# Patient Record
Sex: FEMALE | Race: WHITE | Employment: OTHER | URBAN - METROPOLITAN AREA
[De-identification: names, ages, dates, MRNs, and addresses within clinical notes are randomized per-mention and may not be internally consistent; named-entity substitution may affect disease eponyms.]

---

## 2017-01-21 ENCOUNTER — HOSPITAL ENCOUNTER (OUTPATIENT)
Dept: OTHER | Age: 72
Discharge: HOME | End: 2017-01-21
Attending: PHYSICAL MEDICINE & REHABILITATION | Admitting: PHYSICAL MEDICINE & REHABILITATION

## 2017-01-21 ENCOUNTER — HOSPITAL ENCOUNTER (OUTPATIENT)
Dept: OTHER | Age: 72
Discharge: HOME | End: 2017-01-21
Attending: INTERNAL MEDICINE | Admitting: INTERNAL MEDICINE

## 2017-01-21 LAB
ALBUMIN SERPL-MCNC: 4.1 G/DL (ref 3.4–5)
ANION GAP SERPL CALCULATED.3IONS-SCNC: 15 MMOL/L (ref 3–16)
BACTERIA: ABNORMAL /HPF
BILIRUBIN URINE: NEGATIVE
BLOOD, URINE: ABNORMAL
BUN BLDV-MCNC: 17 MG/DL (ref 7–20)
CALCIUM SERPL-MCNC: 9.4 MG/DL (ref 8.3–10.6)
CHLORIDE BLD-SCNC: 106 MMOL/L (ref 99–110)
CLARITY: ABNORMAL
CO2: 21 MMOL/L (ref 21–32)
COLOR: YELLOW
CREAT SERPL-MCNC: 0.9 MG/DL (ref 0.6–1.2)
EKG ATRIAL RATE: 75 BPM
EKG DIAGNOSIS: NORMAL
EKG P AXIS: 72 DEGREES
EKG P-R INTERVAL: 168 MS
EKG Q-T INTERVAL: 422 MS
EKG QRS DURATION: 90 MS
EKG QTC CALCULATION (BAZETT): 471 MS
EKG R AXIS: 8 DEGREES
EKG T AXIS: 55 DEGREES
EKG VENTRICULAR RATE: 75 BPM
EPITHELIAL CELLS, UA: ABNORMAL /HPF
GFR AFRICAN AMERICAN: >60
GFR NON-AFRICAN AMERICAN: >60
GLUCOSE BLD-MCNC: 116 MG/DL (ref 70–99)
GLUCOSE URINE: NEGATIVE MG/DL
HCT VFR BLD CALC: 38.2 % (ref 36–48)
HEMOGLOBIN: 12.4 G/DL (ref 12–16)
KETONES, URINE: NEGATIVE MG/DL
LEUKOCYTE ESTERASE, URINE: ABNORMAL
MAGNESIUM: 2.3 MG/DL (ref 1.8–2.4)
MCH RBC QN AUTO: 28.8 PG (ref 26–34)
MCHC RBC AUTO-ENTMCNC: 32.4 G/DL (ref 31–36)
MCV RBC AUTO: 88.9 FL (ref 80–100)
MICROSCOPIC EXAMINATION: YES
MUCUS: ABNORMAL /LPF
NITRITE, URINE: NEGATIVE
PDW BLD-RTO: 19.6 % (ref 12.4–15.4)
PH UA: 5
PHOSPHORUS: 3.9 MG/DL (ref 2.5–4.9)
PLATELET # BLD: 242 K/UL (ref 135–450)
PMV BLD AUTO: 6.9 FL (ref 5–10.5)
POTASSIUM SERPL-SCNC: 4.5 MMOL/L (ref 3.5–5.1)
PROTEIN UA: NEGATIVE MG/DL
RBC # BLD: 4.3 M/UL (ref 4–5.2)
RBC UA: ABNORMAL /HPF (ref 0–2)
SODIUM BLD-SCNC: 142 MMOL/L (ref 136–145)
SPECIFIC GRAVITY UA: 1.01
URINE TYPE: ABNORMAL
UROBILINOGEN, URINE: 0.2 E.U./DL
WBC # BLD: 7.6 K/UL (ref 4–11)
WBC UA: ABNORMAL /HPF (ref 0–5)

## 2017-01-21 PROCEDURE — 93010 ELECTROCARDIOGRAM REPORT: CPT | Performed by: INTERNAL MEDICINE

## 2017-02-10 ENCOUNTER — TELEPHONE (OUTPATIENT)
Dept: SURGERY | Age: 72
End: 2017-02-10

## 2017-02-10 ENCOUNTER — OFFICE VISIT (OUTPATIENT)
Dept: FAMILY MEDICINE CLINIC | Age: 72
End: 2017-02-10

## 2017-02-10 VITALS
TEMPERATURE: 98.4 F | SYSTOLIC BLOOD PRESSURE: 139 MMHG | BODY MASS INDEX: 35.15 KG/M2 | DIASTOLIC BLOOD PRESSURE: 94 MMHG | RESPIRATION RATE: 16 BRPM | OXYGEN SATURATION: 96 % | HEIGHT: 62 IN | HEART RATE: 85 BPM | WEIGHT: 191 LBS

## 2017-02-10 DIAGNOSIS — K43.2 VENTRAL INCISIONAL HERNIA: Primary | ICD-10-CM

## 2017-02-10 DIAGNOSIS — R10.9 ABDOMINAL PAIN, UNSPECIFIED LOCATION: ICD-10-CM

## 2017-02-10 PROCEDURE — 3014F SCREEN MAMMO DOC REV: CPT | Performed by: NURSE PRACTITIONER

## 2017-02-10 PROCEDURE — G8427 DOCREV CUR MEDS BY ELIG CLIN: HCPCS | Performed by: NURSE PRACTITIONER

## 2017-02-10 PROCEDURE — 99215 OFFICE O/P EST HI 40 MIN: CPT | Performed by: NURSE PRACTITIONER

## 2017-02-10 PROCEDURE — 1090F PRES/ABSN URINE INCON ASSESS: CPT | Performed by: NURSE PRACTITIONER

## 2017-02-10 PROCEDURE — 4040F PNEUMOC VAC/ADMIN/RCVD: CPT | Performed by: NURSE PRACTITIONER

## 2017-02-10 PROCEDURE — 3017F COLORECTAL CA SCREEN DOC REV: CPT | Performed by: NURSE PRACTITIONER

## 2017-02-10 PROCEDURE — 1036F TOBACCO NON-USER: CPT | Performed by: NURSE PRACTITIONER

## 2017-02-10 PROCEDURE — G8417 CALC BMI ABV UP PARAM F/U: HCPCS | Performed by: NURSE PRACTITIONER

## 2017-02-10 PROCEDURE — G8484 FLU IMMUNIZE NO ADMIN: HCPCS | Performed by: NURSE PRACTITIONER

## 2017-02-10 ASSESSMENT — ENCOUNTER SYMPTOMS
BACK PAIN: 1
VOMITING: 0
DIARRHEA: 0
TROUBLE SWALLOWING: 0
COUGH: 0
NAUSEA: 0
CHEST TIGHTNESS: 0
SHORTNESS OF BREATH: 0
SINUS PRESSURE: 0
WHEEZING: 0
CONSTIPATION: 1
ABDOMINAL PAIN: 1
EYE REDNESS: 0
COLOR CHANGE: 0
SORE THROAT: 0
EYE ITCHING: 1

## 2017-02-10 ASSESSMENT — PATIENT HEALTH QUESTIONNAIRE - PHQ9
2. FEELING DOWN, DEPRESSED OR HOPELESS: 0
SUM OF ALL RESPONSES TO PHQ QUESTIONS 1-9: 0
1. LITTLE INTEREST OR PLEASURE IN DOING THINGS: 0
SUM OF ALL RESPONSES TO PHQ9 QUESTIONS 1 & 2: 0

## 2017-02-15 ENCOUNTER — HOSPITAL ENCOUNTER (OUTPATIENT)
Dept: OTHER | Age: 72
Discharge: HOME | End: 2017-02-15
Attending: INTERNAL MEDICINE | Admitting: INTERNAL MEDICINE

## 2017-02-15 LAB
ALBUMIN SERPL-MCNC: 3.9 G/DL (ref 3.4–5)
ANION GAP SERPL CALCULATED.3IONS-SCNC: 17 MMOL/L (ref 3–16)
BUN BLDV-MCNC: 11 MG/DL (ref 7–20)
CALCIUM SERPL-MCNC: 9 MG/DL (ref 8.3–10.6)
CHLORIDE BLD-SCNC: 103 MMOL/L (ref 99–110)
CO2: 18 MMOL/L (ref 21–32)
CREAT SERPL-MCNC: 0.9 MG/DL (ref 0.6–1.2)
GFR AFRICAN AMERICAN: >60
GFR NON-AFRICAN AMERICAN: >60
GLUCOSE BLD-MCNC: 145 MG/DL (ref 70–99)
MAGNESIUM: 2 MG/DL (ref 1.8–2.4)
PHOSPHORUS: 3.6 MG/DL (ref 2.5–4.9)
POTASSIUM SERPL-SCNC: 3.9 MMOL/L (ref 3.5–5.1)
SODIUM BLD-SCNC: 138 MMOL/L (ref 136–145)

## 2017-02-16 ENCOUNTER — HOSPITAL ENCOUNTER (OUTPATIENT)
Dept: SURGERY | Age: 72
Discharge: HOME | End: 2017-02-16
Attending: SURGERY | Admitting: SURGERY

## 2017-02-16 VITALS
HEART RATE: 85 BPM | HEIGHT: 62 IN | SYSTOLIC BLOOD PRESSURE: 146 MMHG | DIASTOLIC BLOOD PRESSURE: 69 MMHG | OXYGEN SATURATION: 97 % | TEMPERATURE: 97.3 F | RESPIRATION RATE: 14 BRPM | BODY MASS INDEX: 34.96 KG/M2 | WEIGHT: 190 LBS

## 2017-02-16 PROCEDURE — 49656 PR LAP, RECURRENT INCISIONAL HERNIA REPAIR,REDUCIBLE: CPT | Performed by: SURGERY

## 2017-02-16 RX ORDER — SODIUM CHLORIDE 0.9 % (FLUSH) 0.9 %
10 SYRINGE (ML) INJECTION EVERY 12 HOURS SCHEDULED
Status: DISCONTINUED | OUTPATIENT
Start: 2017-02-16 | End: 2017-02-17 | Stop reason: HOSPADM

## 2017-02-16 RX ORDER — SODIUM CHLORIDE, SODIUM LACTATE, POTASSIUM CHLORIDE, CALCIUM CHLORIDE 600; 310; 30; 20 MG/100ML; MG/100ML; MG/100ML; MG/100ML
INJECTION, SOLUTION INTRAVENOUS CONTINUOUS
Status: DISCONTINUED | OUTPATIENT
Start: 2017-02-16 | End: 2017-02-17 | Stop reason: HOSPADM

## 2017-02-16 RX ORDER — MORPHINE SULFATE 2 MG/ML
1 INJECTION, SOLUTION INTRAMUSCULAR; INTRAVENOUS EVERY 5 MIN PRN
Status: DISCONTINUED | OUTPATIENT
Start: 2017-02-16 | End: 2017-02-17 | Stop reason: HOSPADM

## 2017-02-16 RX ORDER — MEPERIDINE HYDROCHLORIDE 50 MG/ML
12.5 INJECTION INTRAMUSCULAR; INTRAVENOUS; SUBCUTANEOUS EVERY 5 MIN PRN
Status: DISCONTINUED | OUTPATIENT
Start: 2017-02-16 | End: 2017-02-17 | Stop reason: HOSPADM

## 2017-02-16 RX ORDER — MORPHINE SULFATE 2 MG/ML
2 INJECTION, SOLUTION INTRAMUSCULAR; INTRAVENOUS EVERY 5 MIN PRN
Status: DISCONTINUED | OUTPATIENT
Start: 2017-02-16 | End: 2017-02-17 | Stop reason: HOSPADM

## 2017-02-16 RX ORDER — ONDANSETRON 2 MG/ML
4 INJECTION INTRAMUSCULAR; INTRAVENOUS
Status: ACTIVE | OUTPATIENT
Start: 2017-02-16 | End: 2017-02-16

## 2017-02-16 RX ORDER — DIPHENHYDRAMINE HYDROCHLORIDE 50 MG/ML
12.5 INJECTION INTRAMUSCULAR; INTRAVENOUS
Status: ACTIVE | OUTPATIENT
Start: 2017-02-16 | End: 2017-02-16

## 2017-02-16 RX ORDER — PROMETHAZINE HYDROCHLORIDE 25 MG/ML
6.25 INJECTION, SOLUTION INTRAMUSCULAR; INTRAVENOUS
Status: ACTIVE | OUTPATIENT
Start: 2017-02-16 | End: 2017-02-16

## 2017-02-16 RX ORDER — OXYCODONE HYDROCHLORIDE AND ACETAMINOPHEN 5; 325 MG/1; MG/1
1 TABLET ORAL PRN
Status: COMPLETED | OUTPATIENT
Start: 2017-02-16 | End: 2017-02-16

## 2017-02-16 RX ORDER — SODIUM CHLORIDE 0.9 % (FLUSH) 0.9 %
10 SYRINGE (ML) INJECTION PRN
Status: DISCONTINUED | OUTPATIENT
Start: 2017-02-16 | End: 2017-02-17 | Stop reason: HOSPADM

## 2017-02-16 RX ORDER — LABETALOL HYDROCHLORIDE 5 MG/ML
5 INJECTION, SOLUTION INTRAVENOUS EVERY 10 MIN PRN
Status: DISCONTINUED | OUTPATIENT
Start: 2017-02-16 | End: 2017-02-17 | Stop reason: HOSPADM

## 2017-02-16 RX ORDER — OXYCODONE HYDROCHLORIDE AND ACETAMINOPHEN 5; 325 MG/1; MG/1
2 TABLET ORAL PRN
Status: COMPLETED | OUTPATIENT
Start: 2017-02-16 | End: 2017-02-16

## 2017-02-16 RX ORDER — HYDRALAZINE HYDROCHLORIDE 20 MG/ML
5 INJECTION INTRAMUSCULAR; INTRAVENOUS EVERY 10 MIN PRN
Status: DISCONTINUED | OUTPATIENT
Start: 2017-02-16 | End: 2017-02-17 | Stop reason: HOSPADM

## 2017-02-16 RX ORDER — LIDOCAINE HYDROCHLORIDE 10 MG/ML
0.3 INJECTION, SOLUTION EPIDURAL; INFILTRATION; INTRACAUDAL; PERINEURAL
Status: ACTIVE | OUTPATIENT
Start: 2017-02-16 | End: 2017-02-16

## 2017-02-16 RX ADMIN — OXYCODONE HYDROCHLORIDE AND ACETAMINOPHEN 2 TABLET: 5; 325 TABLET ORAL at 13:20

## 2017-02-16 RX ADMIN — Medication 0.25 MG: at 12:27

## 2017-02-16 RX ADMIN — Medication 270 ML: at 11:27

## 2017-02-16 RX ADMIN — Medication 0.25 MG: at 12:35

## 2017-02-16 RX ADMIN — Medication 0.5 MG: at 12:02

## 2017-02-16 RX ADMIN — Medication 0.5 MG: at 12:12

## 2017-02-16 RX ADMIN — SODIUM CHLORIDE, SODIUM LACTATE, POTASSIUM CHLORIDE, CALCIUM CHLORIDE: 600; 310; 30; 20 INJECTION, SOLUTION INTRAVENOUS at 08:18

## 2017-02-16 ASSESSMENT — PAIN SCALES - GENERAL
PAINLEVEL_OUTOF10: 8
PAINLEVEL_OUTOF10: 6
PAINLEVEL_OUTOF10: 6
PAINLEVEL_OUTOF10: 8
PAINLEVEL_OUTOF10: 8
PAINLEVEL_OUTOF10: 0
PAINLEVEL_OUTOF10: 0
PAINLEVEL_OUTOF10: 8

## 2017-02-16 ASSESSMENT — PAIN DESCRIPTION - PAIN TYPE
TYPE: ACUTE PAIN
TYPE: ACUTE PAIN

## 2017-02-16 ASSESSMENT — PAIN DESCRIPTION - ORIENTATION
ORIENTATION: MID
ORIENTATION: MID

## 2017-02-16 ASSESSMENT — PAIN DESCRIPTION - LOCATION
LOCATION: ABDOMEN
LOCATION: ABDOMEN

## 2017-03-03 ENCOUNTER — OFFICE VISIT (OUTPATIENT)
Dept: SURGERY | Age: 72
End: 2017-03-03

## 2017-03-03 VITALS
SYSTOLIC BLOOD PRESSURE: 126 MMHG | BODY MASS INDEX: 35.59 KG/M2 | HEIGHT: 62 IN | WEIGHT: 193.4 LBS | HEART RATE: 66 BPM | DIASTOLIC BLOOD PRESSURE: 82 MMHG

## 2017-03-03 DIAGNOSIS — Z09 POSTOP CHECK: ICD-10-CM

## 2017-03-03 PROCEDURE — 99024 POSTOP FOLLOW-UP VISIT: CPT | Performed by: SURGERY

## 2017-03-16 ENCOUNTER — HOSPITAL ENCOUNTER (OUTPATIENT)
Dept: OTHER | Age: 72
Discharge: HOME | End: 2017-03-16
Attending: DERMATOLOGY | Admitting: DERMATOLOGY

## 2017-03-16 LAB
ALBUMIN SERPL-MCNC: 4.1 G/DL (ref 3.4–5)
ALP BLD-CCNC: 126 U/L (ref 40–129)
ALT SERPL-CCNC: 9 U/L (ref 10–40)
ANION GAP SERPL CALCULATED.3IONS-SCNC: 18 MMOL/L (ref 3–16)
AST SERPL-CCNC: 11 U/L (ref 15–37)
BASOPHILS ABSOLUTE: 0.1 K/UL (ref 0–0.2)
BASOPHILS RELATIVE PERCENT: 0.6 %
BILIRUB SERPL-MCNC: <0.2 MG/DL (ref 0–1)
BILIRUBIN DIRECT: <0.2 MG/DL (ref 0–0.3)
BILIRUBIN, INDIRECT: ABNORMAL MG/DL (ref 0–1)
BUN BLDV-MCNC: 20 MG/DL (ref 7–20)
CALCIUM SERPL-MCNC: 9.2 MG/DL (ref 8.3–10.6)
CHLORIDE BLD-SCNC: 95 MMOL/L (ref 99–110)
CO2: 23 MMOL/L (ref 21–32)
CREAT SERPL-MCNC: 1 MG/DL (ref 0.6–1.2)
EOSINOPHILS ABSOLUTE: 0.3 K/UL (ref 0–0.6)
EOSINOPHILS RELATIVE PERCENT: 3.8 %
GFR AFRICAN AMERICAN: >60
GFR NON-AFRICAN AMERICAN: 54
GLUCOSE BLD-MCNC: 196 MG/DL (ref 70–99)
HCT VFR BLD CALC: 39.6 % (ref 36–48)
HEMOGLOBIN: 12.7 G/DL (ref 12–16)
LYMPHOCYTES ABSOLUTE: 1.4 K/UL (ref 1–5.1)
LYMPHOCYTES RELATIVE PERCENT: 16.5 %
MCH RBC QN AUTO: 28.5 PG (ref 26–34)
MCHC RBC AUTO-ENTMCNC: 32 G/DL (ref 31–36)
MCV RBC AUTO: 89.2 FL (ref 80–100)
MONOCYTES ABSOLUTE: 0.4 K/UL (ref 0–1.3)
MONOCYTES RELATIVE PERCENT: 4.2 %
NEUTROPHILS ABSOLUTE: 6.4 K/UL (ref 1.7–7.7)
NEUTROPHILS RELATIVE PERCENT: 74.9 %
PDW BLD-RTO: 18.2 % (ref 12.4–15.4)
PHOSPHORUS: 3 MG/DL (ref 2.5–4.9)
PLATELET # BLD: 288 K/UL (ref 135–450)
PMV BLD AUTO: 7.2 FL (ref 5–10.5)
POTASSIUM SERPL-SCNC: 2.9 MMOL/L (ref 3.5–5.1)
RBC # BLD: 4.44 M/UL (ref 4–5.2)
SODIUM BLD-SCNC: 136 MMOL/L (ref 136–145)
TOTAL PROTEIN: 7.1 G/DL (ref 6.4–8.2)
WBC # BLD: 8.6 K/UL (ref 4–11)

## 2017-03-17 ENCOUNTER — TELEPHONE (OUTPATIENT)
Dept: FAMILY MEDICINE CLINIC | Age: 72
End: 2017-03-17

## 2017-03-17 DIAGNOSIS — E87.6 HYPOKALEMIA: ICD-10-CM

## 2017-03-17 DIAGNOSIS — I10 ESSENTIAL HYPERTENSION: Primary | ICD-10-CM

## 2017-03-20 ENCOUNTER — HOSPITAL ENCOUNTER (OUTPATIENT)
Dept: OTHER | Age: 72
Discharge: HOME | End: 2017-03-20
Attending: FAMILY MEDICINE | Admitting: FAMILY MEDICINE

## 2017-03-21 LAB
ANION GAP SERPL CALCULATED.3IONS-SCNC: 17 MMOL/L (ref 3–16)
BUN BLDV-MCNC: 15 MG/DL (ref 7–20)
CALCIUM SERPL-MCNC: 9.2 MG/DL (ref 8.3–10.6)
CHLORIDE BLD-SCNC: 102 MMOL/L (ref 99–110)
CO2: 22 MMOL/L (ref 21–32)
CREAT SERPL-MCNC: 0.9 MG/DL (ref 0.6–1.2)
GFR AFRICAN AMERICAN: >60
GFR NON-AFRICAN AMERICAN: >60
GLUCOSE BLD-MCNC: 164 MG/DL (ref 70–99)
POTASSIUM SERPL-SCNC: 3.9 MMOL/L (ref 3.5–5.1)
SODIUM BLD-SCNC: 141 MMOL/L (ref 136–145)

## 2017-03-24 RX ORDER — RANITIDINE 150 MG/1
TABLET ORAL
Qty: 180 TABLET | Refills: 1 | Status: SHIPPED | OUTPATIENT
Start: 2017-03-24 | End: 2017-09-18 | Stop reason: SDUPTHER

## 2017-04-14 ENCOUNTER — HOSPITAL ENCOUNTER (OUTPATIENT)
Dept: OTHER | Age: 72
Discharge: HOME | End: 2017-04-14
Attending: INTERNAL MEDICINE | Admitting: INTERNAL MEDICINE

## 2017-04-14 LAB
ANION GAP SERPL CALCULATED.3IONS-SCNC: 15 MMOL/L (ref 3–16)
BUN BLDV-MCNC: 16 MG/DL (ref 7–20)
CALCIUM SERPL-MCNC: 9.5 MG/DL (ref 8.3–10.6)
CHLORIDE BLD-SCNC: 100 MMOL/L (ref 99–110)
CO2: 24 MMOL/L (ref 21–32)
CREAT SERPL-MCNC: 0.9 MG/DL (ref 0.6–1.2)
GFR AFRICAN AMERICAN: >60
GFR NON-AFRICAN AMERICAN: >60
GLUCOSE BLD-MCNC: 122 MG/DL (ref 70–99)
POTASSIUM SERPL-SCNC: 3.4 MMOL/L (ref 3.5–5.1)
SODIUM BLD-SCNC: 139 MMOL/L (ref 136–145)

## 2017-06-20 PROBLEM — L03.116 LEFT LEG CELLULITIS: Status: ACTIVE | Noted: 2017-06-20

## 2017-07-07 ENCOUNTER — TELEPHONE (OUTPATIENT)
Dept: FAMILY MEDICINE CLINIC | Age: 72
End: 2017-07-07

## 2017-07-07 ENCOUNTER — HOSPITAL ENCOUNTER (OUTPATIENT)
Dept: OTHER | Age: 72
Discharge: HOME | End: 2017-07-07
Attending: DERMATOLOGY | Admitting: DERMATOLOGY

## 2017-07-07 LAB
ALBUMIN SERPL-MCNC: 4.6 G/DL (ref 3.4–5)
ALP BLD-CCNC: 117 U/L (ref 40–129)
ALT SERPL-CCNC: 14 U/L (ref 10–40)
ANION GAP SERPL CALCULATED.3IONS-SCNC: 19 MMOL/L (ref 3–16)
AST SERPL-CCNC: 18 U/L (ref 15–37)
BASOPHILS ABSOLUTE: 0 K/UL (ref 0–0.2)
BASOPHILS RELATIVE PERCENT: 0.6 %
BILIRUB SERPL-MCNC: <0.2 MG/DL (ref 0–1)
BILIRUBIN DIRECT: <0.2 MG/DL (ref 0–0.3)
BILIRUBIN, INDIRECT: NORMAL MG/DL (ref 0–1)
BUN BLDV-MCNC: 16 MG/DL (ref 7–20)
CALCIUM SERPL-MCNC: 9.8 MG/DL (ref 8.3–10.6)
CHLORIDE BLD-SCNC: 94 MMOL/L (ref 99–110)
CO2: 26 MMOL/L (ref 21–32)
CREAT SERPL-MCNC: 1.1 MG/DL (ref 0.6–1.2)
EOSINOPHILS ABSOLUTE: 0.5 K/UL (ref 0–0.6)
EOSINOPHILS RELATIVE PERCENT: 6.5 %
GFR AFRICAN AMERICAN: 59
GFR NON-AFRICAN AMERICAN: 48
GLUCOSE BLD-MCNC: 117 MG/DL (ref 70–99)
HCT VFR BLD CALC: 40.1 % (ref 36–48)
HEMOGLOBIN: 12.9 G/DL (ref 12–16)
LYMPHOCYTES ABSOLUTE: 1.5 K/UL (ref 1–5.1)
LYMPHOCYTES RELATIVE PERCENT: 19.8 %
MCH RBC QN AUTO: 28.9 PG (ref 26–34)
MCHC RBC AUTO-ENTMCNC: 32.2 G/DL (ref 31–36)
MCV RBC AUTO: 89.8 FL (ref 80–100)
MONOCYTES ABSOLUTE: 0.5 K/UL (ref 0–1.3)
MONOCYTES RELATIVE PERCENT: 7.1 %
NEUTROPHILS ABSOLUTE: 5 K/UL (ref 1.7–7.7)
NEUTROPHILS RELATIVE PERCENT: 66 %
PDW BLD-RTO: 20.3 % (ref 12.4–15.4)
PHOSPHORUS: 4.1 MG/DL (ref 2.5–4.9)
PLATELET # BLD: 295 K/UL (ref 135–450)
PMV BLD AUTO: 7.3 FL (ref 5–10.5)
POTASSIUM SERPL-SCNC: 3.4 MMOL/L (ref 3.5–5.1)
RBC # BLD: 4.47 M/UL (ref 4–5.2)
SODIUM BLD-SCNC: 139 MMOL/L (ref 136–145)
TOTAL PROTEIN: 7.6 G/DL (ref 6.4–8.2)
WBC # BLD: 7.6 K/UL (ref 4–11)

## 2017-07-07 RX ORDER — CEPHALEXIN 500 MG/1
500 CAPSULE ORAL 3 TIMES DAILY
Qty: 30 CAPSULE | Refills: 0 | Status: SHIPPED | OUTPATIENT
Start: 2017-07-07 | End: 2017-07-17

## 2017-07-12 ENCOUNTER — OFFICE VISIT (OUTPATIENT)
Dept: FAMILY MEDICINE CLINIC | Age: 72
End: 2017-07-12

## 2017-07-12 VITALS
WEIGHT: 165 LBS | RESPIRATION RATE: 16 BRPM | BODY MASS INDEX: 32.39 KG/M2 | SYSTOLIC BLOOD PRESSURE: 115 MMHG | DIASTOLIC BLOOD PRESSURE: 88 MMHG | HEART RATE: 88 BPM | TEMPERATURE: 98 F | HEIGHT: 60 IN

## 2017-07-12 DIAGNOSIS — L97.822 ULCER OF SHIN, LEFT, WITH FAT LAYER EXPOSED (HCC): Primary | ICD-10-CM

## 2017-07-12 DIAGNOSIS — I10 ESSENTIAL HYPERTENSION: ICD-10-CM

## 2017-07-12 PROCEDURE — 1111F DSCHRG MED/CURRENT MED MERGE: CPT | Performed by: FAMILY MEDICINE

## 2017-07-12 PROCEDURE — 99213 OFFICE O/P EST LOW 20 MIN: CPT | Performed by: FAMILY MEDICINE

## 2017-07-12 PROCEDURE — G8417 CALC BMI ABV UP PARAM F/U: HCPCS | Performed by: FAMILY MEDICINE

## 2017-07-12 PROCEDURE — 3014F SCREEN MAMMO DOC REV: CPT | Performed by: FAMILY MEDICINE

## 2017-07-12 PROCEDURE — 1123F ACP DISCUSS/DSCN MKR DOCD: CPT | Performed by: FAMILY MEDICINE

## 2017-07-12 PROCEDURE — 1090F PRES/ABSN URINE INCON ASSESS: CPT | Performed by: FAMILY MEDICINE

## 2017-07-12 PROCEDURE — 1036F TOBACCO NON-USER: CPT | Performed by: FAMILY MEDICINE

## 2017-07-12 PROCEDURE — 4040F PNEUMOC VAC/ADMIN/RCVD: CPT | Performed by: FAMILY MEDICINE

## 2017-07-12 PROCEDURE — 3017F COLORECTAL CA SCREEN DOC REV: CPT | Performed by: FAMILY MEDICINE

## 2017-07-12 PROCEDURE — G8399 PT W/DXA RESULTS DOCUMENT: HCPCS | Performed by: FAMILY MEDICINE

## 2017-07-12 PROCEDURE — G8427 DOCREV CUR MEDS BY ELIG CLIN: HCPCS | Performed by: FAMILY MEDICINE

## 2017-07-12 RX ORDER — POLYETHYLENE GLYCOL 3350 17 G/17G
POWDER, FOR SOLUTION ORAL
Qty: 527 G | Refills: 10 | Status: SHIPPED | OUTPATIENT
Start: 2017-07-12 | End: 2017-07-18 | Stop reason: SDUPTHER

## 2017-07-12 RX ORDER — MORPHINE SULFATE 10 MG/1
10 CAPSULE, EXTENDED RELEASE ORAL DAILY
COMMUNITY
End: 2017-07-25 | Stop reason: ALTCHOICE

## 2017-07-12 ASSESSMENT — ENCOUNTER SYMPTOMS
WHEEZING: 0
CONSTIPATION: 1
BACK PAIN: 1
COUGH: 0

## 2017-07-13 ENCOUNTER — TELEPHONE (OUTPATIENT)
Dept: FAMILY MEDICINE CLINIC | Age: 72
End: 2017-07-13

## 2017-07-14 ENCOUNTER — TELEPHONE (OUTPATIENT)
Dept: FAMILY MEDICINE CLINIC | Age: 72
End: 2017-07-14

## 2017-07-16 LAB
GRAM STAIN RESULT: NORMAL
WOUND/ABSCESS: NORMAL

## 2017-07-18 ENCOUNTER — TELEPHONE (OUTPATIENT)
Dept: FAMILY MEDICINE CLINIC | Age: 72
End: 2017-07-18

## 2017-07-18 RX ORDER — POLYETHYLENE GLYCOL 3350 17 G/17G
POWDER, FOR SOLUTION ORAL
Qty: 1000 G | Refills: 10 | Status: SHIPPED | OUTPATIENT
Start: 2017-07-18 | End: 2018-07-19 | Stop reason: SDUPTHER

## 2017-07-18 RX ORDER — CEPHALEXIN 500 MG/1
500 CAPSULE ORAL 3 TIMES DAILY
Qty: 20 CAPSULE | Refills: 0 | Status: SHIPPED | OUTPATIENT
Start: 2017-07-18 | End: 2017-07-25 | Stop reason: ALTCHOICE

## 2017-07-25 ENCOUNTER — HOSPITAL ENCOUNTER (OUTPATIENT)
Dept: WOUND CARE | Age: 72
Discharge: HOME | End: 2017-07-25
Attending: CLINICAL NURSE SPECIALIST | Admitting: CLINICAL NURSE SPECIALIST

## 2017-07-25 VITALS
TEMPERATURE: 97.8 F | DIASTOLIC BLOOD PRESSURE: 88 MMHG | RESPIRATION RATE: 18 BRPM | HEART RATE: 91 BPM | SYSTOLIC BLOOD PRESSURE: 134 MMHG | HEIGHT: 60 IN | BODY MASS INDEX: 34.87 KG/M2 | WEIGHT: 177.6 LBS

## 2017-07-25 PROCEDURE — 97597 DBRDMT OPN WND 1ST 20 CM/<: CPT | Performed by: CLINICAL NURSE SPECIALIST

## 2017-07-25 PROCEDURE — 17250 CHEM CAUT OF GRANLTJ TISSUE: CPT | Performed by: CLINICAL NURSE SPECIALIST

## 2017-07-25 RX ORDER — MORPHINE SULFATE 15 MG/1
15 TABLET ORAL EVERY 4 HOURS PRN
COMMUNITY
End: 2017-07-25 | Stop reason: ALTCHOICE

## 2017-07-25 RX ORDER — ERGOCALCIFEROL 1.25 MG/1
50000 CAPSULE ORAL
COMMUNITY

## 2017-07-25 RX ORDER — MORPHINE SULFATE 15 MG/1
15 TABLET, FILM COATED, EXTENDED RELEASE ORAL 2 TIMES DAILY
COMMUNITY

## 2017-07-25 ASSESSMENT — PAIN DESCRIPTION - ORIENTATION: ORIENTATION: LEFT

## 2017-07-25 ASSESSMENT — PAIN DESCRIPTION - PROGRESSION: CLINICAL_PROGRESSION: GRADUALLY WORSENING

## 2017-07-25 ASSESSMENT — PAIN SCALES - GENERAL: PAINLEVEL_OUTOF10: 5

## 2017-07-25 ASSESSMENT — PAIN DESCRIPTION - PAIN TYPE: TYPE: ACUTE PAIN

## 2017-07-25 ASSESSMENT — PAIN DESCRIPTION - ONSET: ONSET: GRADUAL

## 2017-07-25 ASSESSMENT — PAIN DESCRIPTION - FREQUENCY: FREQUENCY: CONTINUOUS

## 2017-07-25 ASSESSMENT — PAIN DESCRIPTION - DESCRIPTORS: DESCRIPTORS: ACHING;BURNING

## 2017-07-25 ASSESSMENT — PAIN DESCRIPTION - LOCATION: LOCATION: LEG

## 2017-08-01 ENCOUNTER — HOSPITAL ENCOUNTER (OUTPATIENT)
Dept: WOUND CARE | Age: 72
Discharge: HOME | End: 2017-08-01
Attending: CLINICAL NURSE SPECIALIST | Admitting: CLINICAL NURSE SPECIALIST

## 2017-08-01 VITALS
HEART RATE: 48 BPM | WEIGHT: 176.4 LBS | DIASTOLIC BLOOD PRESSURE: 87 MMHG | BODY MASS INDEX: 35.56 KG/M2 | SYSTOLIC BLOOD PRESSURE: 146 MMHG | HEIGHT: 59 IN | RESPIRATION RATE: 16 BRPM | TEMPERATURE: 97.4 F

## 2017-08-01 PROCEDURE — 97597 DBRDMT OPN WND 1ST 20 CM/<: CPT | Performed by: CLINICAL NURSE SPECIALIST

## 2017-08-01 ASSESSMENT — PAIN DESCRIPTION - ONSET: ONSET: ON-GOING

## 2017-08-01 ASSESSMENT — PAIN DESCRIPTION - ORIENTATION: ORIENTATION: LEFT;LOWER;OUTER

## 2017-08-01 ASSESSMENT — PAIN SCALES - GENERAL: PAINLEVEL_OUTOF10: 8

## 2017-08-01 ASSESSMENT — PAIN DESCRIPTION - LOCATION: LOCATION: LEG

## 2017-08-01 ASSESSMENT — PAIN DESCRIPTION - PAIN TYPE: TYPE: CHRONIC PAIN

## 2017-08-01 ASSESSMENT — PAIN DESCRIPTION - FREQUENCY: FREQUENCY: CONTINUOUS

## 2017-08-01 ASSESSMENT — PAIN DESCRIPTION - PROGRESSION: CLINICAL_PROGRESSION: NOT CHANGED

## 2017-08-01 ASSESSMENT — PAIN DESCRIPTION - DESCRIPTORS: DESCRIPTORS: STABBING

## 2017-08-08 ENCOUNTER — HOSPITAL ENCOUNTER (OUTPATIENT)
Dept: WOUND CARE | Age: 72
Discharge: HOME | End: 2017-08-08
Attending: CLINICAL NURSE SPECIALIST | Admitting: CLINICAL NURSE SPECIALIST

## 2017-08-08 VITALS
DIASTOLIC BLOOD PRESSURE: 78 MMHG | TEMPERATURE: 99.1 F | WEIGHT: 178.38 LBS | HEART RATE: 54 BPM | BODY MASS INDEX: 36.03 KG/M2 | SYSTOLIC BLOOD PRESSURE: 143 MMHG | RESPIRATION RATE: 20 BRPM

## 2017-08-08 PROCEDURE — 97597 DBRDMT OPN WND 1ST 20 CM/<: CPT | Performed by: CLINICAL NURSE SPECIALIST

## 2017-08-08 RX ORDER — DOXYCYCLINE HYCLATE 100 MG
100 TABLET ORAL 2 TIMES DAILY
Qty: 20 TABLET | Refills: 0 | Status: SHIPPED | OUTPATIENT
Start: 2017-08-08 | End: 2017-08-18

## 2017-08-08 ASSESSMENT — PAIN DESCRIPTION - PROGRESSION: CLINICAL_PROGRESSION: NOT CHANGED

## 2017-08-08 ASSESSMENT — PAIN DESCRIPTION - PAIN TYPE: TYPE: CHRONIC PAIN

## 2017-08-08 ASSESSMENT — PAIN DESCRIPTION - FREQUENCY: FREQUENCY: INTERMITTENT

## 2017-08-08 ASSESSMENT — PAIN SCALES - GENERAL: PAINLEVEL_OUTOF10: 7

## 2017-08-08 ASSESSMENT — PAIN DESCRIPTION - DESCRIPTORS: DESCRIPTORS: STABBING;BURNING

## 2017-08-08 ASSESSMENT — PAIN DESCRIPTION - ORIENTATION: ORIENTATION: LEFT

## 2017-08-08 ASSESSMENT — PAIN DESCRIPTION - LOCATION: LOCATION: LEG

## 2017-08-08 ASSESSMENT — PAIN DESCRIPTION - ONSET: ONSET: ON-GOING

## 2017-08-11 LAB
GRAM STAIN RESULT: NORMAL
WOUND/ABSCESS: NORMAL

## 2017-08-15 ENCOUNTER — HOSPITAL ENCOUNTER (OUTPATIENT)
Dept: WOUND CARE | Age: 72
Discharge: HOME | End: 2017-08-15
Attending: INTERNAL MEDICINE | Admitting: INTERNAL MEDICINE

## 2017-08-15 VITALS
SYSTOLIC BLOOD PRESSURE: 138 MMHG | BODY MASS INDEX: 34.88 KG/M2 | HEIGHT: 59 IN | WEIGHT: 173 LBS | TEMPERATURE: 99.2 F | RESPIRATION RATE: 17 BRPM | DIASTOLIC BLOOD PRESSURE: 85 MMHG | HEART RATE: 91 BPM

## 2017-08-15 PROCEDURE — 97597 DBRDMT OPN WND 1ST 20 CM/<: CPT | Performed by: INTERNAL MEDICINE

## 2017-08-20 PROBLEM — L03.116 LEFT LEG CELLULITIS: Status: RESOLVED | Noted: 2017-06-20 | Resolved: 2017-08-20

## 2017-08-20 PROBLEM — Z09 POSTOP CHECK: Status: RESOLVED | Noted: 2017-03-03 | Resolved: 2017-08-20

## 2017-08-22 ENCOUNTER — HOSPITAL ENCOUNTER (OUTPATIENT)
Dept: WOUND CARE | Age: 72
Discharge: HOME | End: 2017-08-22
Attending: CLINICAL NURSE SPECIALIST | Admitting: CLINICAL NURSE SPECIALIST

## 2017-08-22 VITALS
WEIGHT: 170 LBS | RESPIRATION RATE: 18 BRPM | HEART RATE: 69 BPM | SYSTOLIC BLOOD PRESSURE: 120 MMHG | HEIGHT: 59 IN | BODY MASS INDEX: 34.27 KG/M2 | TEMPERATURE: 97.3 F | DIASTOLIC BLOOD PRESSURE: 74 MMHG

## 2017-08-22 PROCEDURE — 97597 DBRDMT OPN WND 1ST 20 CM/<: CPT | Performed by: CLINICAL NURSE SPECIALIST

## 2017-08-22 ASSESSMENT — PAIN DESCRIPTION - PROGRESSION: CLINICAL_PROGRESSION: NOT CHANGED

## 2017-08-22 ASSESSMENT — PAIN DESCRIPTION - ONSET: ONSET: ON-GOING

## 2017-08-22 ASSESSMENT — PAIN DESCRIPTION - FREQUENCY: FREQUENCY: INTERMITTENT

## 2017-08-22 ASSESSMENT — PAIN SCALES - GENERAL: PAINLEVEL_OUTOF10: 5

## 2017-08-22 ASSESSMENT — PAIN DESCRIPTION - DESCRIPTORS: DESCRIPTORS: BURNING;STABBING

## 2017-08-22 ASSESSMENT — PAIN DESCRIPTION - ORIENTATION: ORIENTATION: LEFT

## 2017-08-22 ASSESSMENT — PAIN DESCRIPTION - PAIN TYPE: TYPE: CHRONIC PAIN

## 2017-08-22 ASSESSMENT — PAIN DESCRIPTION - LOCATION: LOCATION: LEG

## 2017-08-23 ENCOUNTER — HOSPITAL ENCOUNTER (OUTPATIENT)
Dept: OTHER | Age: 72
Discharge: HOME | End: 2017-08-23
Attending: FAMILY MEDICINE | Admitting: FAMILY MEDICINE

## 2017-08-23 ENCOUNTER — OFFICE VISIT (OUTPATIENT)
Dept: FAMILY MEDICINE CLINIC | Age: 72
End: 2017-08-23

## 2017-08-23 VITALS
DIASTOLIC BLOOD PRESSURE: 80 MMHG | BODY MASS INDEX: 34.13 KG/M2 | SYSTOLIC BLOOD PRESSURE: 134 MMHG | HEART RATE: 68 BPM | OXYGEN SATURATION: 96 % | RESPIRATION RATE: 16 BRPM | WEIGHT: 169 LBS | TEMPERATURE: 98 F

## 2017-08-23 DIAGNOSIS — M54.6 ACUTE MIDLINE THORACIC BACK PAIN: ICD-10-CM

## 2017-08-23 DIAGNOSIS — M54.6 ACUTE MIDLINE THORACIC BACK PAIN: Primary | ICD-10-CM

## 2017-08-23 LAB
BASOPHILS ABSOLUTE: 0 K/UL (ref 0–0.2)
BASOPHILS RELATIVE PERCENT: 0.4 %
EOSINOPHILS ABSOLUTE: 0.2 K/UL (ref 0–0.6)
EOSINOPHILS RELATIVE PERCENT: 1.8 %
HCT VFR BLD CALC: 41.4 % (ref 36–48)
HEMOGLOBIN: 13.4 G/DL (ref 12–16)
LYMPHOCYTES ABSOLUTE: 1.1 K/UL (ref 1–5.1)
LYMPHOCYTES RELATIVE PERCENT: 11.9 %
MCH RBC QN AUTO: 28.8 PG (ref 26–34)
MCHC RBC AUTO-ENTMCNC: 32.5 G/DL (ref 31–36)
MCV RBC AUTO: 88.7 FL (ref 80–100)
MONOCYTES ABSOLUTE: 0.3 K/UL (ref 0–1.3)
MONOCYTES RELATIVE PERCENT: 3.7 %
NEUTROPHILS ABSOLUTE: 7.5 K/UL (ref 1.7–7.7)
NEUTROPHILS RELATIVE PERCENT: 82.2 %
PDW BLD-RTO: 21.7 % (ref 12.4–15.4)
PLATELET # BLD: 261 K/UL (ref 135–450)
PMV BLD AUTO: 7.3 FL (ref 5–10.5)
RBC # BLD: 4.67 M/UL (ref 4–5.2)
REASON FOR REJECTION: NORMAL
REJECTED TEST: NORMAL
WBC # BLD: 9.1 K/UL (ref 4–11)

## 2017-08-23 PROCEDURE — 3017F COLORECTAL CA SCREEN DOC REV: CPT | Performed by: FAMILY MEDICINE

## 2017-08-23 PROCEDURE — G8399 PT W/DXA RESULTS DOCUMENT: HCPCS | Performed by: FAMILY MEDICINE

## 2017-08-23 PROCEDURE — 3014F SCREEN MAMMO DOC REV: CPT | Performed by: FAMILY MEDICINE

## 2017-08-23 PROCEDURE — 4040F PNEUMOC VAC/ADMIN/RCVD: CPT | Performed by: FAMILY MEDICINE

## 2017-08-23 PROCEDURE — 1090F PRES/ABSN URINE INCON ASSESS: CPT | Performed by: FAMILY MEDICINE

## 2017-08-23 PROCEDURE — G8417 CALC BMI ABV UP PARAM F/U: HCPCS | Performed by: FAMILY MEDICINE

## 2017-08-23 PROCEDURE — 1123F ACP DISCUSS/DSCN MKR DOCD: CPT | Performed by: FAMILY MEDICINE

## 2017-08-23 PROCEDURE — 1036F TOBACCO NON-USER: CPT | Performed by: FAMILY MEDICINE

## 2017-08-23 PROCEDURE — 99213 OFFICE O/P EST LOW 20 MIN: CPT | Performed by: FAMILY MEDICINE

## 2017-08-23 PROCEDURE — G8427 DOCREV CUR MEDS BY ELIG CLIN: HCPCS | Performed by: FAMILY MEDICINE

## 2017-08-23 ASSESSMENT — ENCOUNTER SYMPTOMS
SHORTNESS OF BREATH: 1
COUGH: 0
WHEEZING: 0
VOMITING: 0
NAUSEA: 0
CONSTIPATION: 0
BACK PAIN: 1

## 2017-08-24 RX ORDER — MELOXICAM 15 MG/1
15 TABLET ORAL DAILY
Qty: 20 TABLET | Refills: 1 | Status: SHIPPED | OUTPATIENT
Start: 2017-08-24 | End: 2017-09-05 | Stop reason: SINTOL

## 2017-08-24 RX ORDER — MELOXICAM 15 MG/1
15 TABLET ORAL DAILY
Qty: 90 TABLET | Refills: 1 | OUTPATIENT
Start: 2017-08-24

## 2017-08-29 ENCOUNTER — HOSPITAL ENCOUNTER (OUTPATIENT)
Dept: WOUND CARE | Age: 72
Discharge: HOME | End: 2017-08-29
Attending: CLINICAL NURSE SPECIALIST | Admitting: CLINICAL NURSE SPECIALIST

## 2017-08-29 VITALS
HEART RATE: 44 BPM | HEIGHT: 59 IN | WEIGHT: 173 LBS | TEMPERATURE: 97.2 F | BODY MASS INDEX: 34.88 KG/M2 | DIASTOLIC BLOOD PRESSURE: 72 MMHG | SYSTOLIC BLOOD PRESSURE: 134 MMHG | RESPIRATION RATE: 16 BRPM

## 2017-08-29 PROCEDURE — 97597 DBRDMT OPN WND 1ST 20 CM/<: CPT | Performed by: CLINICAL NURSE SPECIALIST

## 2017-08-29 ASSESSMENT — PAIN SCALES - GENERAL: PAINLEVEL_OUTOF10: 0

## 2017-09-05 ENCOUNTER — HOSPITAL ENCOUNTER (OUTPATIENT)
Dept: WOUND CARE | Age: 72
Discharge: HOME | End: 2017-09-05
Attending: CLINICAL NURSE SPECIALIST | Admitting: CLINICAL NURSE SPECIALIST

## 2017-09-05 VITALS
HEIGHT: 59 IN | WEIGHT: 172 LBS | HEART RATE: 85 BPM | TEMPERATURE: 97.5 F | DIASTOLIC BLOOD PRESSURE: 94 MMHG | BODY MASS INDEX: 34.68 KG/M2 | SYSTOLIC BLOOD PRESSURE: 129 MMHG | RESPIRATION RATE: 16 BRPM

## 2017-09-05 DIAGNOSIS — L23.9 ALLERGIC DERMATITIS: ICD-10-CM

## 2017-09-05 PROCEDURE — 97597 DBRDMT OPN WND 1ST 20 CM/<: CPT | Performed by: CLINICAL NURSE SPECIALIST

## 2017-09-09 PROBLEM — L23.9 ALLERGIC DERMATITIS: Status: ACTIVE | Noted: 2017-09-09

## 2017-09-11 RX ORDER — FUROSEMIDE 40 MG/1
TABLET ORAL
Qty: 180 TABLET | Refills: 0 | Status: SHIPPED | OUTPATIENT
Start: 2017-09-11 | End: 2018-01-15 | Stop reason: SDUPTHER

## 2017-09-12 ENCOUNTER — HOSPITAL ENCOUNTER (OUTPATIENT)
Dept: WOUND CARE | Age: 72
Discharge: HOME | End: 2017-09-12
Attending: CLINICAL NURSE SPECIALIST | Admitting: CLINICAL NURSE SPECIALIST

## 2017-09-12 VITALS
BODY MASS INDEX: 34.07 KG/M2 | HEIGHT: 59 IN | SYSTOLIC BLOOD PRESSURE: 142 MMHG | DIASTOLIC BLOOD PRESSURE: 85 MMHG | WEIGHT: 169 LBS | RESPIRATION RATE: 16 BRPM | TEMPERATURE: 97.5 F | HEART RATE: 79 BPM

## 2017-09-12 PROCEDURE — 97597 DBRDMT OPN WND 1ST 20 CM/<: CPT | Performed by: CLINICAL NURSE SPECIALIST

## 2017-09-12 ASSESSMENT — PAIN DESCRIPTION - PROGRESSION: CLINICAL_PROGRESSION: NOT CHANGED

## 2017-09-12 ASSESSMENT — PAIN DESCRIPTION - LOCATION: LOCATION: LEG

## 2017-09-12 ASSESSMENT — PAIN DESCRIPTION - DESCRIPTORS: DESCRIPTORS: BURNING

## 2017-09-12 ASSESSMENT — PAIN DESCRIPTION - FREQUENCY: FREQUENCY: INTERMITTENT

## 2017-09-12 ASSESSMENT — PAIN DESCRIPTION - ONSET: ONSET: ON-GOING

## 2017-09-12 ASSESSMENT — PAIN DESCRIPTION - ORIENTATION: ORIENTATION: LEFT

## 2017-09-12 ASSESSMENT — PAIN SCALES - GENERAL: PAINLEVEL_OUTOF10: 4

## 2017-09-12 ASSESSMENT — PAIN DESCRIPTION - PAIN TYPE: TYPE: CHRONIC PAIN

## 2017-09-26 ENCOUNTER — HOSPITAL ENCOUNTER (OUTPATIENT)
Dept: WOUND CARE | Age: 72
Discharge: HOME | End: 2017-09-26
Attending: CLINICAL NURSE SPECIALIST | Admitting: CLINICAL NURSE SPECIALIST

## 2017-09-26 VITALS
BODY MASS INDEX: 34.07 KG/M2 | TEMPERATURE: 98.4 F | SYSTOLIC BLOOD PRESSURE: 141 MMHG | DIASTOLIC BLOOD PRESSURE: 93 MMHG | HEART RATE: 82 BPM | WEIGHT: 169 LBS | HEIGHT: 59 IN | RESPIRATION RATE: 17 BRPM

## 2017-09-26 DIAGNOSIS — L23.9 ALLERGIC DERMATITIS: ICD-10-CM

## 2017-09-26 ASSESSMENT — PAIN DESCRIPTION - FREQUENCY: FREQUENCY: INTERMITTENT

## 2017-09-26 ASSESSMENT — PAIN DESCRIPTION - ORIENTATION: ORIENTATION: LEFT

## 2017-09-26 ASSESSMENT — PAIN DESCRIPTION - PROGRESSION: CLINICAL_PROGRESSION: NOT CHANGED

## 2017-09-26 ASSESSMENT — PAIN DESCRIPTION - DESCRIPTORS: DESCRIPTORS: BURNING

## 2017-09-26 ASSESSMENT — PAIN DESCRIPTION - LOCATION: LOCATION: LEG

## 2017-09-26 ASSESSMENT — PAIN SCALES - GENERAL: PAINLEVEL_OUTOF10: 6

## 2017-09-26 ASSESSMENT — PAIN DESCRIPTION - ONSET: ONSET: ON-GOING

## 2017-09-26 ASSESSMENT — PAIN DESCRIPTION - PAIN TYPE: TYPE: CHRONIC PAIN

## 2017-09-28 RX ORDER — RANITIDINE 150 MG/1
TABLET ORAL
Qty: 180 TABLET | Refills: 2 | Status: SHIPPED | OUTPATIENT
Start: 2017-09-28 | End: 2018-07-23 | Stop reason: SDUPTHER

## 2017-10-03 ENCOUNTER — HOSPITAL ENCOUNTER (OUTPATIENT)
Dept: WOUND CARE | Age: 72
Discharge: HOME | End: 2017-10-03
Attending: CLINICAL NURSE SPECIALIST | Admitting: CLINICAL NURSE SPECIALIST

## 2017-10-03 VITALS
DIASTOLIC BLOOD PRESSURE: 79 MMHG | HEART RATE: 84 BPM | RESPIRATION RATE: 16 BRPM | SYSTOLIC BLOOD PRESSURE: 121 MMHG | TEMPERATURE: 97 F | HEIGHT: 59 IN | BODY MASS INDEX: 33.87 KG/M2 | WEIGHT: 168 LBS

## 2017-10-03 DIAGNOSIS — L23.9 ALLERGIC DERMATITIS: ICD-10-CM

## 2017-10-03 PROCEDURE — 97597 DBRDMT OPN WND 1ST 20 CM/<: CPT | Performed by: CLINICAL NURSE SPECIALIST

## 2017-10-03 ASSESSMENT — PAIN DESCRIPTION - ORIENTATION: ORIENTATION: LEFT

## 2017-10-03 ASSESSMENT — PAIN DESCRIPTION - FREQUENCY: FREQUENCY: INTERMITTENT

## 2017-10-03 ASSESSMENT — PAIN DESCRIPTION - LOCATION: LOCATION: LEG

## 2017-10-03 ASSESSMENT — PAIN DESCRIPTION - PAIN TYPE: TYPE: CHRONIC PAIN

## 2017-10-03 ASSESSMENT — PAIN DESCRIPTION - ONSET: ONSET: ON-GOING

## 2017-10-03 ASSESSMENT — PAIN DESCRIPTION - DESCRIPTORS: DESCRIPTORS: BURNING

## 2017-10-03 ASSESSMENT — PAIN DESCRIPTION - PROGRESSION: CLINICAL_PROGRESSION: NOT CHANGED

## 2017-10-03 ASSESSMENT — PAIN SCALES - GENERAL: PAINLEVEL_OUTOF10: 6

## 2017-10-03 NOTE — IP AVS SNAPSHOT
Patient Information     Patient Name SOCRATES Sanchez 1945         This is your updated medication list to keep with you all times      ASK your doctor about these medications     albuterol sulfate  (90 Base) MCG/ACT inhaler   Commonly known as:  PROAIR HFA   Inhale 2 puffs into the lungs every 4 hours as needed for Wheezing.        baclofen 10 MG tablet   Commonly known as:  LIORESAL   TAKE 1 TABLET BY MOUTH THREE TIMES DAILY       chlorthalidone 25 MG tablet   Commonly known as:  HYGROTON       doxepin 75 MG capsule   Commonly known as:  SINEQUAN   Take 1 capsule by mouth nightly       FLOVENT  MCG/ACT inhaler   Generic drug:  fluticasone   INHALE 2 PUFFS BY MOUTH TWICE DAILY       fluticasone 50 MCG/ACT nasal spray   Commonly known as:  FLONASE   SPRAY TWICE IN EACH NOSTRIL DAILY       folic acid 1 MG tablet   Commonly known as:  FOLVITE       furosemide 40 MG tablet   Commonly known as:  LASIX   TAKE 1 TO 2 TABLETS BY MOUTH DAILY AS NEEDED FOR SWELLING       methotrexate 2.5 MG chemo tablet   Commonly known as:  RHEUMATREX       morphine 15 MG extended release tablet   Commonly known as:  MS CONTIN       oxyCODONE HCl 10 MG immediate release tablet   Commonly known as:  OXY-IR       polyethylene glycol powder   Commonly known as:  GLYCOLAX   USE 1 SCOOP BID       potassium chloride 20 MEQ extended release tablet   Commonly known as:  KLOR-CON M   TAKE 1 TABLET BY MOUTH THREE TIMES DAILY       ranitidine 150 MG tablet   Commonly known as:  ZANTAC   TAKE 1 TABLET BY MOUTH TWICE DAILY       spironolactone 25 MG tablet   Commonly known as:  ALDACTONE       topiramate 50 MG tablet   Commonly known as:  TOPAMAX       triamcinolone 0.1 % ointment   Commonly known as:  KENALOG       vitamin D 17435 units Caps capsule   Commonly known as:  ERGOCALCIFEROL

## 2017-10-03 NOTE — IP AVS SNAPSHOT
After Visit Summary  (Discharge Instructions)    Medication List for Home    Based on the information you provided to us as well as any changes during this visit, the following is your updated medication list.  Compare this with your prescription bottles at home. If you have any questions or concerns, contact your primary care physician's office. Daily Medication List (This medication list can be shared with any healthcare provider who is helping you manage your medications)      ASK your doctor about these medications if you have questions        Last Dose    Next Dose Due AM NOON PM NIGHT    albuterol sulfate  (90 Base) MCG/ACT inhaler   Commonly known as:  PROAIR HFA   Inhale 2 puffs into the lungs every 4 hours as needed for Wheezing.                                          baclofen 10 MG tablet   Commonly known as:  LIORESAL   TAKE 1 TABLET BY MOUTH THREE TIMES DAILY                                         chlorthalidone 25 MG tablet   Commonly known as:  HYGROTON   Take 25 mg by mouth daily Taking 1/2 tab                                         doxepin 75 MG capsule   Commonly known as:  SINEQUAN   Take 1 capsule by mouth nightly                                         FLOVENT  MCG/ACT inhaler   Generic drug:  fluticasone   INHALE 2 PUFFS BY MOUTH TWICE DAILY                                         fluticasone 50 MCG/ACT nasal spray   Commonly known as:  FLONASE   SPRAY TWICE IN EACH NOSTRIL DAILY                                         folic acid 1 MG tablet   Commonly known as:  FOLVITE   Take 1 mg by mouth daily                                         furosemide 40 MG tablet   Commonly known as:  LASIX   TAKE 1 TO 2 TABLETS BY MOUTH DAILY AS NEEDED FOR SWELLING                                         methotrexate 2.5 MG chemo tablet   Commonly known as:  RHEUMATREX   Take 10 mg by mouth once a week Every Friday for severe eczema morphine 15 MG extended release tablet   Commonly known as:  MS CONTIN   Take 15 mg by mouth 2 times daily . oxyCODONE HCl 10 MG immediate release tablet   Commonly known as:  OXY-IR   Take 10 mg by mouth 4 times daily as needed  . polyethylene glycol powder   Commonly known as:  GLYCOLAX   USE 1 SCOOP BID                                         potassium chloride 20 MEQ extended release tablet   Commonly known as:  KLOR-CON M   TAKE 1 TABLET BY MOUTH THREE TIMES DAILY                                         ranitidine 150 MG tablet   Commonly known as:  ZANTAC   TAKE 1 TABLET BY MOUTH TWICE DAILY                                         spironolactone 25 MG tablet   Commonly known as:  ALDACTONE   Take 25 mg by mouth 2 times daily                                         topiramate 50 MG tablet   Commonly known as:  TOPAMAX   Take 50 mg by mouth 2 times daily                                         triamcinolone 0.1 % ointment   Commonly known as:  KENALOG   Apply 1 each topically daily Apply topically 2 times daily.                                          vitamin D 30819 units Caps capsule   Commonly known as:  ERGOCALCIFEROL   Take 50,000 Units by mouth every 30 days                                                 Allergies as of 10/3/2017        Reactions    Latex Other (See Comments)    Skin breaks out with latex bandaids and tape    Nystatin Hives, Itching, Rash    Moustapha [Ensure] Hives    Other Dermatitis    citrus    Remeron [Mirtazapine] Itching    Sulfa Antibiotics Hives, Itching    Theophyllines Other (See Comments)    Causes headache    Wound Dressings [Woun'dres Hydrogel Wound Dress] Dermatitis    itching    Tape [Adhesive Tape] Rash    Use paper tape      Immunizations as of 10/3/2017     Name Date Dose VIS Date Route    Influenza Vaccine, unspecified formulation 11/11/2016 -- -- --    Comment: jessica Influenza Virus Vaccine 9/22/2014 -- -- --    Comment: jessica    Influenza, High Dose 9/15/2017 -- -- --    Pneumococcal 13-valent Conjugate (Xhalhay55) 12/14/2015 0.5 mL 11/5/2015 Intramuscular      Last Vitals          Most Recent Value    Temp  97 °F (36.1 °C)    Pulse  84    Resp  16    BP  121/79         After Visit Summary    This summary was created for you. Thank you for entrusting your care to us. The following information includes details about your hospital/visit stay along with steps you should take to help with your recovery once you leave the hospital.  In this packet, you will find information about the topics listed below:    · Instructions about your medications including a list of your home medications  · A summary of your hospital visit  · Follow-up appointments once you have left the hospital  · Your care plan at home      You may receive a survey regarding the care you received during your stay. Your input is valuable to us. We encourage you to complete and return your survey in the envelope provided. We hope you will choose us in the future for your healthcare needs. Patient Information     Patient Name Eun Stephen 1213 1945      Care Provided at:     Name Address Phone       [facility] [address] [phone]            Your Visit    Here you will find information about your visit, including the reason for your visit. Please take this sheet with you when you visit your doctor or other health care provider in the future. It will help determine the best possible medical care for you at that time. If you have any questions once you leave the hospital, please call the department phone number listed below. Why you were here     Your primary diagnosis was:  Not on File    Your diagnoses also included:  Venous Ulcer Of Left Leg (Hcc)      Visit Information     Date & Time Provider Department Dept.  Phone Important information for a smoker       SMOKING: QUIT SMOKING. THIS IS THE MOST IMPORTANT ACTION YOU CAN TAKE TO IMPROVE YOUR CURRENT AND FUTURE HEALTH. [facility] ([phone])    Smoking harms nonsmokers. When nonsmokers are around people who smoke, they absorb nicotine, carbon monoxide, and other ingredients of tobacco smoke. DO NOT SMOKE AROUND CHILDREN     Children exposed to secondhand smoke are at an increased risk of:  Sudden Infant Death Syndrome (SIDS), acute respiratory infections, inflammation of the middle ear, and severe asthma. Over a longer time, it causes heart disease and lung cancer. There is no safe level of exposure to secondhand smoke. [facility] Signup     Our records indicate that you have an active [facility] account. You can view your After Visit Summary by going to [email or web address] and logging in with your [facility] username and password. If you don't have a [facility] username and password but a parent or guardian has access to your record, the parent or guardian should login with their own [facility] username and password and access your record to view the After Visit Summary. Additional Information  If you have questions, please contact the physician practice where you receive care. Remember, [facility] is NOT to be used for urgent needs. For medical emergencies, dial 911. For questions regarding your [facility] account call [phone]. If you have a clinical question, please call your doctor's office. View your information online  ? Review your current list of  medications, immunization, and allergies. ? Review your future test results online . ?  Review your discharge instructions provided by your caregivers at discharge    Certain functionality such as prescription refills, scheduling appointments or sending messages to your provider are not activated if your provider does not use [facility] in his/her office    For questions regarding your MyChart account call [phone]. If you have a clinical question, please call your doctor's office. The information on all pages of the After Visit Summary has been reviewed with me, the patient and/or responsible adult, by my health care provider(s). I had the opportunity to ask questions regarding this information. I understand I should dispose of my armband safely at home to protect my health information. A complete copy of the After Visit Summary has been given to me, the patient and/or responsible adult.            Patient Signature/Responsible Adult:____________________    Clinician Signature:_____________________    Date:_____________________    Time:_____________________

## 2017-10-07 NOTE — PROGRESS NOTES
[facility] Progress Note    Suzanne Arenas     : 1945    DATE OF VISIT:  10/3/2017    Subjective:     Suzanne Arenas is a 74 y.o. female who has a venous ulcer located on the left leg. Significant symptoms or pertinent wound history since last visit: No complaints regarding wound or treatment except wound edges are more tender. Appetite good. Taking MVI daily. No fever or chills. Taking Methotrexate for Eczema. Is elevating and ambulating. Additional ulcer(s) noted? no.     Her current medication list consists of albuterol sulfate HFA, baclofen, chlorthalidone, doxepin, fluticasone, folic acid, furosemide, methotrexate, morphine, oxyCODONE HCl, polyethylene glycol, potassium chloride, ranitidine, spironolactone, topiramate, triamcinolone, and vitamin D. Allergies: Latex; Nystatin; Moustapha [ensure]; Other; Remeron [mirtazapine]; Sulfa antibiotics; Theophyllines; Wound dressings [woun'dres hydrogel wound dress]; and Tape [adhesive tape]    Objective:     Vitals:    10/03/17 1539   BP: 121/79   Pulse: 84   Resp: 16   Temp: 97 °F (36.1 °C)   TempSrc: Oral   Weight: 168 lb (76.2 kg)   Height: 4' 11\" (1.499 m)     General Appearance: alert and oriented to person, place and time, obese, in no acute distress  Psychiatric:  Mood and affect appropriate for situation  Skin: warm and dry, no rash  Head: normocephalic and atraumatic  Eyes: pupils equal, round, sclerae anicteric, conjunctivae normal  ENT: no thrush or oral ulcers  Neck:No complaints, normal appearance  Pulmonary/Chest: Respirations easy at rest, no cough or respiratory distress  Cardiovascular: No chest pain, normal rate, toes warm, cap refill normal, Left DANTE: 1.03  Abdomen: No nausea or vomiting  Extremities: no cyanosis or cellulitis, slight leg edema  Musculoskeletal: Ambulatory, moves all extremities, no deformities  Neurologic: distal sensation to light touch intact, no allodynia.     Erendira-ulcer skin: Measurements:  Wound 07/25/17 #11, left pretib, VLU, partial thickness, onset 6/21/17,gradually appeared-Wound Length (cm): 2.5 cm    Wound 07/25/17 #11, left pretib, VLU, partial thickness, onset 6/21/17,gradually appeared-Wound Width (cm): 3.1 cm    Wound 07/25/17 #11, left pretib, VLU, partial thickness, onset 6/21/17,gradually appeared-Wound Depth (cm) : 0.1    The ulcers were then irrigated with normal saline solution. The procedure was completed with a small amount of bleeding, and hemostasis was by pressure. The patient tolerated the procedure well, with no significant complications. The patient's level of pain during and after the procedure was monitored, and is noted in the wound documentation flowsheet. I reminded the patient of the importance of weight management and ambulation as tolerated, additional lower extremity exercises as instructed in our education sheet, leg elevation when at rest, and compliance with any recommended dietary, diuretic and compression therapies. Discharge plan:     Continue with MVI daily  Leg elevations 2-3 times per day for 30-40 minutes    Treatment in the wound care center today: Wound 07/25/17 #11, left pretib, VLU, partial thickness, onset 6/21/17,gradually appeared-Dressing/Treatment:  (Mupirocin,gauze,Robbie,Med Sgl. Dermafit). Home treatment: good handwashing before and after any dressing changes. Cleanse wound with saline or soap & water before dressing change. May use Vaseline (petrolatum), Aquaphor, Aveeno, CeraVe, Cetaphil, Eucerin, Lubriderm, etc for dry skin. Dressing type for home: Other: Hold Triamcinolone to wound edges for next few days. Mupiriocin to wound and dry dressing daily. Dermafit for compression on AM off PM. Written discharge instructions given to patient. Follow up in 3 weeks.     Electronically signed by JENA Sanchez on 10/7/2017 at 11:56 AM.

## 2017-10-11 ENCOUNTER — HOSPITAL ENCOUNTER (OUTPATIENT)
Dept: OTHER | Age: 72
Discharge: HOME | End: 2017-10-11
Attending: INTERNAL MEDICINE | Admitting: INTERNAL MEDICINE

## 2017-10-11 ENCOUNTER — HOSPITAL ENCOUNTER (OUTPATIENT)
Dept: OTHER | Age: 72
Discharge: HOME | End: 2017-10-11
Attending: DERMATOLOGY | Admitting: DERMATOLOGY

## 2017-10-11 LAB
ALBUMIN SERPL-MCNC: 4 G/DL (ref 3.4–5)
ALP BLD-CCNC: 92 U/L (ref 40–129)
ALT SERPL-CCNC: 17 U/L (ref 10–40)
ANION GAP SERPL CALCULATED.3IONS-SCNC: 20 MMOL/L (ref 3–16)
AST SERPL-CCNC: 24 U/L (ref 15–37)
BASOPHILS ABSOLUTE: 0 K/UL (ref 0–0.2)
BASOPHILS RELATIVE PERCENT: 0.4 %
BILIRUB SERPL-MCNC: 0.3 MG/DL (ref 0–1)
BILIRUBIN DIRECT: <0.2 MG/DL (ref 0–0.3)
BILIRUBIN, INDIRECT: NORMAL MG/DL (ref 0–1)
BUN BLDV-MCNC: 20 MG/DL (ref 7–20)
CALCIUM SERPL-MCNC: 9.5 MG/DL (ref 8.3–10.6)
CHLORIDE BLD-SCNC: 90 MMOL/L (ref 99–110)
CO2: 22 MMOL/L (ref 21–32)
CREAT SERPL-MCNC: 0.9 MG/DL (ref 0.6–1.2)
EOSINOPHILS ABSOLUTE: 0.3 K/UL (ref 0–0.6)
EOSINOPHILS RELATIVE PERCENT: 3 %
GFR AFRICAN AMERICAN: >60
GFR NON-AFRICAN AMERICAN: >60
GLUCOSE BLD-MCNC: 131 MG/DL (ref 70–99)
HCT VFR BLD CALC: 40.3 % (ref 36–48)
HEMOGLOBIN: 13 G/DL (ref 12–16)
LYMPHOCYTES ABSOLUTE: 1.2 K/UL (ref 1–5.1)
LYMPHOCYTES RELATIVE PERCENT: 13.6 %
MAGNESIUM: 2.5 MG/DL (ref 1.8–2.4)
MCH RBC QN AUTO: 28.6 PG (ref 26–34)
MCHC RBC AUTO-ENTMCNC: 32.2 G/DL (ref 31–36)
MCV RBC AUTO: 88.8 FL (ref 80–100)
MONOCYTES ABSOLUTE: 0.3 K/UL (ref 0–1.3)
MONOCYTES RELATIVE PERCENT: 4.1 %
NEUTROPHILS ABSOLUTE: 6.7 K/UL (ref 1.7–7.7)
NEUTROPHILS RELATIVE PERCENT: 78.9 %
PDW BLD-RTO: 22.2 % (ref 12.4–15.4)
PHOSPHORUS: 4.6 MG/DL (ref 2.5–4.9)
PLATELET # BLD: 257 K/UL (ref 135–450)
PMV BLD AUTO: 7.3 FL (ref 5–10.5)
POTASSIUM SERPL-SCNC: 3 MMOL/L (ref 3.5–5.1)
RBC # BLD: 4.53 M/UL (ref 4–5.2)
SODIUM BLD-SCNC: 132 MMOL/L (ref 136–145)
TOTAL PROTEIN: 7.3 G/DL (ref 6.4–8.2)
WBC # BLD: 8.5 K/UL (ref 4–11)

## 2017-10-17 ENCOUNTER — HOSPITAL ENCOUNTER (OUTPATIENT)
Dept: OTHER | Age: 72
Discharge: HOME | End: 2017-10-17
Attending: INTERNAL MEDICINE | Admitting: INTERNAL MEDICINE

## 2017-10-17 LAB
ANION GAP SERPL CALCULATED.3IONS-SCNC: 18 MMOL/L (ref 3–16)
BUN BLDV-MCNC: 13 MG/DL (ref 7–20)
CALCIUM SERPL-MCNC: 9.7 MG/DL (ref 8.3–10.6)
CHLORIDE BLD-SCNC: 95 MMOL/L (ref 99–110)
CO2: 20 MMOL/L (ref 21–32)
CREAT SERPL-MCNC: 0.8 MG/DL (ref 0.6–1.2)
GFR AFRICAN AMERICAN: >60
GFR NON-AFRICAN AMERICAN: >60
GLUCOSE BLD-MCNC: 146 MG/DL (ref 70–99)
POTASSIUM SERPL-SCNC: 3.5 MMOL/L (ref 3.5–5.1)
SODIUM BLD-SCNC: 133 MMOL/L (ref 136–145)

## 2017-10-24 ENCOUNTER — HOSPITAL ENCOUNTER (OUTPATIENT)
Dept: WOUND CARE | Age: 72
Discharge: HOME | End: 2017-10-24
Attending: CLINICAL NURSE SPECIALIST | Admitting: CLINICAL NURSE SPECIALIST

## 2017-10-24 VITALS
WEIGHT: 168 LBS | TEMPERATURE: 97.8 F | HEIGHT: 59 IN | BODY MASS INDEX: 33.87 KG/M2 | HEART RATE: 88 BPM | DIASTOLIC BLOOD PRESSURE: 81 MMHG | RESPIRATION RATE: 18 BRPM | SYSTOLIC BLOOD PRESSURE: 131 MMHG

## 2017-10-24 DIAGNOSIS — L23.9 ALLERGIC DERMATITIS: ICD-10-CM

## 2017-10-24 PROCEDURE — 97597 DBRDMT OPN WND 1ST 20 CM/<: CPT | Performed by: CLINICAL NURSE SPECIALIST

## 2017-10-24 ASSESSMENT — PAIN DESCRIPTION - DESCRIPTORS: DESCRIPTORS: ACHING

## 2017-10-24 ASSESSMENT — PAIN DESCRIPTION - PAIN TYPE: TYPE: CHRONIC PAIN

## 2017-10-24 ASSESSMENT — PAIN DESCRIPTION - ORIENTATION: ORIENTATION: LEFT

## 2017-10-24 ASSESSMENT — PAIN DESCRIPTION - PROGRESSION: CLINICAL_PROGRESSION: NOT CHANGED

## 2017-10-24 ASSESSMENT — PAIN SCALES - GENERAL: PAINLEVEL_OUTOF10: 5

## 2017-10-24 ASSESSMENT — PAIN DESCRIPTION - LOCATION: LOCATION: LEG

## 2017-10-24 ASSESSMENT — PAIN DESCRIPTION - ONSET: ONSET: ON-GOING

## 2017-10-24 ASSESSMENT — PAIN DESCRIPTION - FREQUENCY: FREQUENCY: INTERMITTENT

## 2017-10-25 PROBLEM — L23.9 ALLERGIC DERMATITIS: Status: RESOLVED | Noted: 2017-09-09 | Resolved: 2017-10-25

## 2017-10-25 NOTE — PLAN OF CARE
Problem: Impaired Skin Integrity  Goal: Wound size and drainage will decrease and surrounding tissue/skin remains healthy and intact  Outcome: Ongoing  Pt seen in [facility] - wound unchanged after 3 weeks- reddness to darrius wound decreased. Edema unchanged - Debride per MIA Donovan- discussed skin substitute with pt- PA for epifix pending.  Cont current wound care - Cont mupirocin and dermafit for compression- f/u in 1 week

## 2017-10-25 NOTE — PROGRESS NOTES
[facility] Progress Note    Valentina Larios     : 1945    DATE OF VISIT:  10/24/2017    Subjective:     Valentina Larios is a 74 y.o. female who has a venous ulcer located on the left leg. Significant symptoms or pertinent wound history since last visit: She has help Triamcinolone to wound edges the past 2 weeks. Edges are less inflammed and with improved granulation in wound bed. Offers no complaints regarding wound or treatment. She is elevating her legs and ambulating. She states she has had this wound off and on for 20 years. She is taking Methotrexate for Eczema. Her appetite is good. No fever or chills. We discussed  Epifix application and she is in agreement. We will move forward with PA. Additional ulcer(s) noted? no.     Her current medication list consists of Ciprofloxacin, albuterol sulfate HFA, baclofen, chlorthalidone, doxepin, fluticasone, folic acid, furosemide, methotrexate, morphine, oxyCODONE HCl, polyethylene glycol, potassium chloride, ranitidine, spironolactone, topiramate, triamcinolone, and vitamin D. Allergies: Latex; Nystatin; Moustapha [ensure]; Other; Remeron [mirtazapine]; Sulfa antibiotics; Theophyllines;  Wound dressings [woun'dres hydrogel wound dress]; and Tape [adhesive tape]    Objective:     Vitals:    10/24/17 1318   BP: 131/81   Pulse: 88   Resp: 18   Temp: 97.8 °F (36.6 °C)   TempSrc: Oral   Weight: 168 lb (76.2 kg)   Height: 4' 11\" (1.499 m)     General Appearance: alert and oriented to person, place and time, obese, in no acute distress  Psychiatric:  Mood and affect appropriate for situation  Skin: warm and dry, no rash  Head: normocephalic and atraumatic  Eyes: pupils equal, round, sclerae anicteric, conjunctivae normal  ENT: no thrush or oral ulcers  Neck:No complaints, normal appearance  Pulmonary/Chest: Respirations easy at rest, no cough or respiratory distress  Cardiovascular: No chest pain, normal rate, toes warm, cap refill normal, DANTE: 1.03  Abdomen: No nausea or vomiting  Extremities: no cyanosis or cellulitis. Minimal edema  Musculoskeletal: Ambulatory, moves all extremities, no deformities  Neurologic: distal sensation to light touch impaired, no allodynia. Erendira-ulcer skin: Intact. Ulcer(s): Wound with red granulation and fibrin. Wound bed moist, edges open and attached. Surrounding tissue and ulcer without signs and symptoms of infection. No purulence, malodor, erythema, increased temperature, or increased pain. Pre-debridement wound measurements are in the wound documentation flowsheet. Photos also saved in electronic chart. Assessment:     Patient Active Problem List   Diagnosis Code    Ulcer of calf ([facility] [address] L97.209    Venous (peripheral) insufficiency I87.2    DDD (degenerative disc disease), lumbar M51.36    HTN (hypertension) I10    Asthma J45.909    Raynaud disease I73.00    Edema R60.9    Disc displacement, lumbar M51.26    Lumbar stenosis M48.061    Opiate analgesic contract exists Z02.89    Dyspnea R06.00    Chest pain R07.9    PVC's (premature ventricular contractions) I49.3    Venous ulcer of left leg (HCC) I83.029    Hypotension I95.9    Acute kidney injury ([facility] [address] N17.9    Hypovolemia E86.1    Arthralgia of right hip M25.551    Intrinsic eczema L20.84    Incisional hernia, without obstruction or gangrene K43.2    Allergic dermatitis L23.9       Assessment of today's active condition(s): VLU, slow to heal. Factors contributing to occurrence and/or persistence of the chronic ulcer include edema, venous stasis and immunosuppression. Medical necessity of today's visit is shown by the above documentation. Sharp debridement is indicated today, based upon the exam findings in the wound(s) above. Procedure note:     Consent obtained. Time out performed per [facility] policy.     Anesthetic  Anesthetic: 4% Topical Xylocaine     Using a curette, I sharply debrided the left leg ulcer(s) down through and including (mupirocin per pt, 4x4,claudia dermafit ). Home treatment: good handwashing before and after any dressing changes. Cleanse wound with saline or soap & water before dressing change. May use Vaseline (petrolatum), Aquaphor, Aveeno, CeraVe, Cetaphil, Eucerin, Lubriderm, etc for dry skin. Dressing type for home: Other: Mupirocin and dry dressing daily with single Dermafit on AM off PM. Written discharge instructions given to patient. Follow up in 1 week.     Electronically signed by JENA Carey on 10/25/2017 at 1:41 PM.

## 2017-10-31 ENCOUNTER — HOSPITAL ENCOUNTER (OUTPATIENT)
Dept: WOUND CARE | Age: 72
Discharge: HOME | End: 2017-10-31
Attending: CLINICAL NURSE SPECIALIST | Admitting: CLINICAL NURSE SPECIALIST

## 2017-10-31 VITALS
BODY MASS INDEX: 33.95 KG/M2 | DIASTOLIC BLOOD PRESSURE: 86 MMHG | SYSTOLIC BLOOD PRESSURE: 151 MMHG | HEART RATE: 89 BPM | RESPIRATION RATE: 16 BRPM | WEIGHT: 168.4 LBS | TEMPERATURE: 97.8 F | HEIGHT: 59 IN

## 2017-10-31 PROCEDURE — 15271 SKIN SUB GRAFT TRNK/ARM/LEG: CPT | Performed by: CLINICAL NURSE SPECIALIST

## 2017-10-31 ASSESSMENT — PAIN DESCRIPTION - PROGRESSION: CLINICAL_PROGRESSION: NOT CHANGED

## 2017-10-31 ASSESSMENT — PAIN DESCRIPTION - LOCATION: LOCATION: LEG

## 2017-10-31 ASSESSMENT — PAIN DESCRIPTION - PAIN TYPE: TYPE: CHRONIC PAIN

## 2017-10-31 ASSESSMENT — PAIN SCALES - GENERAL: PAINLEVEL_OUTOF10: 5

## 2017-10-31 ASSESSMENT — PAIN DESCRIPTION - ONSET: ONSET: ON-GOING

## 2017-10-31 ASSESSMENT — PAIN DESCRIPTION - ORIENTATION: ORIENTATION: LEFT

## 2017-10-31 ASSESSMENT — PAIN DESCRIPTION - FREQUENCY: FREQUENCY: INTERMITTENT

## 2017-10-31 ASSESSMENT — PAIN DESCRIPTION - DESCRIPTORS: DESCRIPTORS: ACHING

## 2017-11-01 ENCOUNTER — HOSPITAL ENCOUNTER (OUTPATIENT)
Dept: OTHER | Age: 72
Discharge: HOME | End: 2017-11-01
Attending: PHYSICAL MEDICINE & REHABILITATION | Admitting: PHYSICAL MEDICINE & REHABILITATION

## 2017-11-01 LAB
AMPHETAMINE SCREEN, URINE: ABNORMAL
BARBITURATE SCREEN URINE: ABNORMAL
BENZODIAZEPINE SCREEN, URINE: ABNORMAL
BUPRENORPHINE URINE: ABNORMAL
CANNABINOID SCREEN URINE: ABNORMAL
COCAINE METABOLITE SCREEN URINE: ABNORMAL
Lab: ABNORMAL
METHADONE SCREEN, URINE: ABNORMAL
OPIATE SCREEN URINE: POSITIVE
OXYCODONE URINE: POSITIVE
PH UA: 6
PHENCYCLIDINE SCREEN URINE: ABNORMAL
PROPOXYPHENE SCREEN: ABNORMAL

## 2017-11-02 NOTE — PROGRESS NOTES
[facility] Progress Note    Sagar Dodge     : 1945    DATE OF VISIT:  10/31/2017    Subjective:     Sagar Dodge is a 74 y.o. female who has a venous ulcer located on the left leg. Significant symptoms or pertinent wound history since last visit: Offers no complaints regarding wound or treatment. She is elevating and ambulating. Appetite is good. No fever or chills. Taking Methotrexate for eczema. We will apply #1 application of Epifix today. Patient in agreement. Additional ulcer(s) noted? no.     Her current medication list consists of Lactobacillus, albuterol sulfate HFA, baclofen, chlorthalidone, doxepin, fluticasone, folic acid, furosemide, methotrexate, morphine, oxyCODONE HCl, polyethylene glycol, potassium chloride, ranitidine, spironolactone, topiramate, triamcinolone, and vitamin D. Allergies: Latex; Nystatin; Moustapha [ensure]; Other; Remeron [mirtazapine]; Sulfa antibiotics; Theophyllines;  Wound dressings [woun'dres hydrogel wound dress]; and Tape [adhesive tape]    Objective:     Vitals:    10/31/17 1434   BP: (!) 151/86   Pulse: 89   Resp: 16   Temp: 97.8 °F (36.6 °C)   TempSrc: Oral   Weight: 168 lb 6.4 oz (76.4 kg)   Height: 4' 11\" (1.499 m)     General Appearance: alert and oriented to person, place and time, obese, in no acute distress  Psychiatric:  Mood and affect appropriate for situation  Skin: warm and dry, no rash  Head: normocephalic and atraumatic  Eyes: pupils equal, round, sclerae anicteric, conjunctivae normal  ENT: no thrush or oral ulcers  Neck:No complaints, normal appearance  Pulmonary/Chest: Respirations easy at rest, no cough or respiratory distress  Cardiovascular: No chest pain, normal rate, toes warm, cap refill normal, Left DANTE: 1.03, PT pulse audible with doppler, DP +2  Abdomen: No nausea or vomiting  Extremities: no cyanosis or cellulitis, slight bilateral leg edema  Musculoskeletal: Ambulatory, moves all extremities, no sq cm.    Post  Debridement Measurements:  Wound 07/25/17 #11, left pretib, VLU, full thickness, onset 6/21/17,gradually appeared-Wound Length (cm): 2.3 cm    Wound 07/25/17 #11, left pretib, VLU, full thickness, onset 6/21/17,gradually appeared-Wound Width (cm): 2.7 cm    Wound 07/25/17 #11, left pretib, VLU, full thickness, onset 6/21/17,gradually appeared-Wound Depth (cm) : 0.1    The ulcers were then irrigated with normal saline solution. The procedure was completed with a small amount of bleeding, and hemostasis was by pressure. The patient tolerated the procedure well, with no significant complications. The patient's level of pain during and after the procedure was monitored, and is noted in the wound documentation flowsheet. I reminded the patient of the importance of weight management and ambulation as tolerated, additional lower extremity exercises as instructed in our education sheet, leg elevation when at rest, and compliance with any recommended dietary, diuretic and compression therapies. Because this patient's venous ulcer has failed to respond to standard wound-care measures (including treatment of infection, debridement of necrosis, treatment of edema, provision of a moist wound environment for more than 4 weeks, I recommended EpiFix as adjunctive therapy to help speed healing of this ulcer. As per previous documentation, peripheral pulse exam is acceptable, and DANTE is 1.03. There is no evidence of untreated infection today. Ms. Tabatha Zapata does not currently smoke. After cleansing the left venous leg ulcer with saline and applying Skin-Prep to the darrius-wound skin, a 6 sqcm piece of EpiFix was cut to fit the ulcer, placed into the ulcer bed, covered with mepitel, affixed to the surrounding skin with Steri-Strips, moistened with saline and covered with Mepilex Border. 6 sqcm of the product was used in the ulcer, and 0 sqcm of the product was discarded.  The patient tolerated the procedure well,

## 2017-11-07 ENCOUNTER — HOSPITAL ENCOUNTER (OUTPATIENT)
Dept: WOUND CARE | Age: 72
Discharge: HOME | End: 2017-11-07
Attending: CLINICAL NURSE SPECIALIST | Admitting: CLINICAL NURSE SPECIALIST

## 2017-11-07 VITALS
RESPIRATION RATE: 18 BRPM | WEIGHT: 167 LBS | HEIGHT: 59 IN | HEART RATE: 76 BPM | SYSTOLIC BLOOD PRESSURE: 134 MMHG | TEMPERATURE: 97 F | BODY MASS INDEX: 33.67 KG/M2 | DIASTOLIC BLOOD PRESSURE: 88 MMHG

## 2017-11-07 PROCEDURE — 15271 SKIN SUB GRAFT TRNK/ARM/LEG: CPT | Performed by: CLINICAL NURSE SPECIALIST

## 2017-11-10 NOTE — PROGRESS NOTES
[facility] Progress Note    Mandy Morrow     : 1945    DATE OF VISIT:  2017    Subjective:     Mandy Morrow is a 74 y.o. female who has a venous ulcer located on the left leg. Significant symptoms or pertinent wound history since last visit: Offers no complaints regarding wound or treatment. Is elevating her legs and ambulating. Appetite is good. Taking MVI daily and added protein in diet. Patient placed into Complex Wound Care due to Methotrexate extending wound healing time. We will apply #2 application of Epifix today. Additional ulcer(s) noted? no.     Her current medication list consists of Lactobacillus, albuterol sulfate HFA, baclofen, chlorthalidone, doxepin, fluticasone, folic acid, furosemide, methotrexate, morphine, oxyCODONE HCl, polyethylene glycol, potassium chloride, ranitidine, spironolactone, topiramate, triamcinolone, and vitamin D. Allergies: Latex; Nystatin; Moustapha [ensure]; Other; Remeron [mirtazapine]; Sulfa antibiotics; Theophyllines;  Wound dressings [woun'dres hydrogel wound dress]; and Tape [adhesive tape]    Objective:     Vitals:    17 1538   BP: 134/88   Pulse: 76   Resp: 18   Temp: 97 °F (36.1 °C)   TempSrc: Oral   Weight: 167 lb (75.8 kg)   Height: 4' 11\" (1.499 m)     General Appearance: alert and oriented to person, place and time, obese, in no acute distress  Psychiatric:  Mood and affect appropriate for situation  Skin: warm and dry, no rash  Head: normocephalic and atraumatic  Eyes: pupils equal, round, sclerae anicteric, conjunctivae normal  ENT: no thrush or oral ulcers  Neck:No complaints, normal appearance  Pulmonary/Chest: Respirations easy at rest, no cough or respiratory distress  Cardiovascular: No chest pain, normal rate, toes warm, cap refill normal, Left DANTE: 1.03  Abdomen: No nausea or vomiting  Extremities: no cyanosis, edema or cellulitis  Musculoskeletal: Ambulatory with walker, moves all extremities, no deformities  Neurologic: distal sensation to light touch intact, no allodynia. Erendira-ulcer skin: Intact. Ulcer(s): Ulcer with red granulation, fibrin and increased epithelization. Wound bed moist, edges open and attached. Surrounding tissue and ulcer without signs and symptoms of infection. No purulence, malodor, erythema, increased temperature, or increased pain. Pre-debridement wound measurements are in the wound documentation flowsheet. Photos also saved in electronic chart. Assessment:     Patient Active Problem List   Diagnosis Code    Ulcer of calf ([facility] [address] L97.209    Venous (peripheral) insufficiency I87.2    DDD (degenerative disc disease), lumbar M51.36    HTN (hypertension) I10    Asthma J45.909    Raynaud disease I73.00    Edema R60.9    Disc displacement, lumbar M51.26    Lumbar stenosis M48.061    Opiate analgesic contract exists Z02.89    Dyspnea R06.00    Chest pain R07.9    PVC's (premature ventricular contractions) I49.3    Venous ulcer of left leg (HCC) I83.029    Hypotension I95.9    Acute kidney injury ([facility] [address] N17.9    Hypovolemia E86.1    Arthralgia of right hip M25.551    Intrinsic eczema L20.84    Incisional hernia, without obstruction or gangrene K43.2       Assessment of today's active condition(s): Left VLU slow to heal. Factors contributing to occurrence and/or persistence of the chronic ulcer include edema, venous stasis, immunosuppression and Raynauld disease. Medical necessity of today's visit is shown by the above documentation. Sharp debridement is indicated today, based upon the exam findings in the wound(s) above. Procedure note:     Consent obtained. Time out performed per [facility] policy. Anesthetic  Anesthetic: None     Using a curette, I sharply debrided the left leg ulcer(s) down through and including the removal of epidermis and dermis. The type(s) of tissue debrided included fibrin and biofilm. Total Surface Area Debrided: 6 sq cm.     Post Debridement Measurements:  Wound 07/25/17 #11, left pretib, VLU, full thickness, onset 6/21/17,gradually appeared-Wound Length (cm): 2.4 cm    Wound 07/25/17 #11, left pretib, VLU, full thickness, onset 6/21/17,gradually appeared-Wound Width (cm): 2.5 cm    Wound 07/25/17 #11, left pretib, VLU, full thickness, onset 6/21/17,gradually appeared-Wound Depth (cm) : 0.1    The ulcers were then irrigated with normal saline solution. The procedure was completed with a small amount of bleeding, and hemostasis was by pressure. The patient tolerated the procedure well, with no significant complications. The patient's level of pain during and after the procedure was monitored, and is noted in the wound documentation flowsheet. I reminded the patient of the importance of weight management and ambulation as tolerated, additional lower extremity exercises as instructed in our education sheet, leg elevation when at rest, and compliance with any recommended dietary, diuretic and compression therapies. Because this patient's venous ulcer has failed to respond to standard wound-care measures (including treatment of infection, debridement of necrosis, treatment of edema, provision of a moist wound environment, and medical treatment of diabetes and compression) for more than 4 weeks, I recommended a second application of EpiFix as adjunctive therapy to help speed healing of this ulcer. As per previous documentation, peripheral pulse exam is acceptable, and DANTE is 1.03. There is no evidence of untreated infection today. Ms. Radha Holman does not currently smoke. After cleansing the left venous ulcer with saline and applying Skin-Prep to the darrius-wound skin, a 11 sqcm piece of EpiFix was cut to fit the ulcer, placed into the ulcer bed, covered wit silver mepitel  affixed to the surrounding skin with Steri-Strips, moistened with saline and covered with Mepilex border.  11 sqcm of the product was used in the ulcer, and 0 sqcm of the product was discarded. The patient tolerated the procedure well, without complications. Discharge plan:     Placed into Complex Wound Care due to patient taking Methotrexate extending wound healing time. Leg elevations 2-3 times per day for 30-40 minutes  Continue with MVI daily and added protein in diet    Treatment in the wound care center today: Wound 07/25/17 #11, left pretib, VLU, full thickness, onset 6/21/17,gradually appeared-Dressing/Treatment: Other (Comment) (single layer medium compression dermafit ). Home treatment: good handwashing before and after any dressing changes. Cleanse wound with saline or soap & water before dressing change. May use Vaseline (petrolatum), Aquaphor, Aveeno, CeraVe, Cetaphil, Eucerin, Lubriderm, etc for dry skin. Dressing type for home: Other: #2 Epifix application, silver mepitel, steri strips, Mepilex border and singly dermafit for compression for the week. Written discharge instructions given to patient. Follow up in 1 week.     Electronically signed by JENA Villalpando on 11/10/2017 at 10:11 AM.

## 2017-11-14 ENCOUNTER — HOSPITAL ENCOUNTER (OUTPATIENT)
Dept: WOUND CARE | Age: 72
Discharge: HOME | End: 2017-11-14
Attending: CLINICAL NURSE SPECIALIST | Admitting: CLINICAL NURSE SPECIALIST

## 2017-11-14 VITALS
TEMPERATURE: 97.3 F | SYSTOLIC BLOOD PRESSURE: 135 MMHG | DIASTOLIC BLOOD PRESSURE: 78 MMHG | HEIGHT: 59 IN | RESPIRATION RATE: 18 BRPM | HEART RATE: 86 BPM | WEIGHT: 169 LBS | BODY MASS INDEX: 34.07 KG/M2

## 2017-11-18 NOTE — PROGRESS NOTES
allodynia. Erendira-ulcer skin: Intact. Ulcer(s): Ulcer with increased red granulation, epithelization and decreased fibrin. Wound bed moist, edges open and attached. Surrounding tissue and ulcer without signs and symptoms of infection. No purulence, malodor, erythema, increased temperature, or increased pain. Pre-debridement wound measurements are in the wound documentation flowsheet. Photos also saved in electronic chart. Assessment:     Patient Active Problem List   Diagnosis Code    Ulcer of calf ([facility] [address] L97.209    Venous (peripheral) insufficiency I87.2    DDD (degenerative disc disease), lumbar M51.36    HTN (hypertension) I10    Asthma J45.909    Raynaud disease I73.00    Edema R60.9    Disc displacement, lumbar M51.26    Lumbar stenosis M48.061    Opiate analgesic contract exists Z02.89    Dyspnea R06.00    Chest pain R07.9    PVC's (premature ventricular contractions) I49.3    Venous ulcer of left leg (HCC) I83.029    Hypotension I95.9    Acute kidney injury ([facility] [address] N17.9    Hypovolemia E86.1    Arthralgia of right hip M25.551    Intrinsic eczema L20.84    Incisional hernia, without obstruction or gangrene K43.2       Assessment of today's active condition(s): VLU slow to heal. Factors contributing to occurrence and/or persistence of the chronic ulcer include edema, venous stasis, obesity, immunosuppression and Raynaud's. Medical necessity of today's visit is shown by the above documentation. Sharp debridement is indicated today, based upon the exam findings in the wound(s) above. Procedure note:     Consent obtained. Time out performed per [facility] policy. Anesthetic  Anesthetic: 4% Lidocaine Liquid Topical     Using a curette, I sharply debrided the left leg ulcer(s) down through and including the removal of epidermis and dermis. The type(s) of tissue debrided included fibrin, biofilm and exudate. Total Surface Area Debrided: 3.78 sq cm.     Post  Debridement Measurements:  Wound 07/25/17 #11, left pretib, VLU, full thickness, onset 6/21/17,gradually appeared-Wound Length (cm): 1.8 cm    Wound 07/25/17 #11, left pretib, VLU, full thickness, onset 6/21/17,gradually appeared-Wound Width (cm): 2.1 cm    Wound 07/25/17 #11, left pretib, VLU, full thickness, onset 6/21/17,gradually appeared-Wound Depth (cm) : 0.1    The ulcers were then irrigated with normal saline solution. The procedure was completed with a small amount of bleeding, and hemostasis was by pressure. The patient tolerated the procedure well, with no significant complications. The patient's level of pain during and after the procedure was monitored, and is noted in the wound documentation flowsheet. I reminded the patient of the importance of weight management and ambulation as tolerated, additional lower extremity exercises as instructed in our education sheet, leg elevation when at rest, and compliance with any recommended dietary, diuretic and compression therapies. Because this patient's left venous leg ulcer has failed to respond to standard wound-care measures (including treatment of infection, debridement of necrosis, treatment of edema, provision of a moist wound environment and compression) for more than 4 weeks, I again have recommended EpiFix as adjunctive therapy to help speed healing of this ulcer. As per previous documentation, peripheral pulse exam is acceptable, and DANTE is 1.03. There is no evidence of untreated infection today. Ms. Mine Griffin does not currently smoke. After cleansing the left venous leg ulcer with saline and applying Skin-Prep to the darrius-wound skin, a 4 x 4.5 mesh (11 sq cm) of EpiFix was cut to fit the ulcer, placed into the ulcer bed, moistened with saline covered with Mepitel Ag, secured with Steri-Strips, and covered with Mepilex Border. 4 x 4.5 mesh (11 sq cm) of the product was used in the ulcer, and 0 sqcm of the product was discarded.  The patient tolerated the procedure well, without complications. Discharge plan:     # 3 Application of Epifix 99/75/56  Complex Wound Management due to Methotrexate extending healing time  Leg elevations 2-3 times per day for 30-40 minutes  Ambulate as tolerated  Continue MVI and added protein in diet    Treatment in the wound care center today: Wound 07/25/17 #11, left pretib, VLU, full thickness, onset 6/21/17,gradually appeared-Dressing/Treatment: Other (Comment) (single layer \"e\" tubigrip). Home treatment: good handwashing before and after any dressing changes. Cleanse wound with saline or soap & water before dressing change. May use Vaseline (petrolatum), Aquaphor, Aveeno, CeraVe, Cetaphil, Eucerin, Lubriderm, etc for dry skin. Dressing type for home: Other: As above for the week. Written discharge instructions given to patient. Follow up in 1 week.     Electronically signed by JENA Medina on 11/18/2017 at 11:57 AM.

## 2017-11-21 ENCOUNTER — HOSPITAL ENCOUNTER (OUTPATIENT)
Dept: WOUND CARE | Age: 72
Discharge: HOME | End: 2017-11-21
Attending: CLINICAL NURSE SPECIALIST | Admitting: CLINICAL NURSE SPECIALIST

## 2017-11-21 VITALS
HEIGHT: 59 IN | DIASTOLIC BLOOD PRESSURE: 85 MMHG | SYSTOLIC BLOOD PRESSURE: 140 MMHG | RESPIRATION RATE: 16 BRPM | TEMPERATURE: 99.1 F | WEIGHT: 166 LBS | HEART RATE: 102 BPM | BODY MASS INDEX: 33.47 KG/M2

## 2017-11-21 PROCEDURE — 99212 OFFICE O/P EST SF 10 MIN: CPT | Performed by: CLINICAL NURSE SPECIALIST

## 2017-11-21 ASSESSMENT — PAIN SCALES - GENERAL: PAINLEVEL_OUTOF10: 0

## 2017-11-24 NOTE — PROGRESS NOTES
insufficiency I87.2    DDD (degenerative disc disease), lumbar M51.36    HTN (hypertension) I10    Asthma J45.909    Raynaud disease I73.00    Edema R60.9    Disc displacement, lumbar M51.26    Lumbar stenosis M48.061    Opiate analgesic contract exists Z02.89    Dyspnea R06.00    Chest pain R07.9    PVC's (premature ventricular contractions) I49.3    Venous ulcer of left leg (HCC) I83.029    Hypotension I95.9    Acute kidney injury (HCC) N17.9    Hypovolemia E86.1    Arthralgia of right hip M25.551    Intrinsic eczema L20.84    Incisional hernia, without obstruction or gangrene K43.2       Assessment of today's active condition(s): Left VLU, slow to heal. Factors contributing to occurrence and/or persistence of the chronic ulcer include edema, venous stasis, obesity and immunosuppression. Medical necessity of today's visit is shown by the above documentation. Sharp debridement is not indicated today, based upon the exam findings in the wound(s) above. I reminded the patient of the importance of weight management and ambulation as tolerated, additional lower extremity exercises as instructed in our education sheet, leg elevation when at rest, and compliance with any recommended dietary, diuretic and compression therapies. Epifix #3 applied 11/14/17  Complex wound care management due to co-morbidities requiring Methotrexate that extend wound healing times    Discharge plan:     Leg elevations 2-3 times per day for 30-40 minutes  Continue with MVI and added protein in diet    Treatment in the wound care center today: Wound 07/25/17 #11, left pretib, VLU, full thickness, onset 6/21/17,gradually appeared-Dressing/Treatment: Other (Comment) (single layer \"e\" dermafit ). Home treatment: good handwashing before and after any dressing changes. Cleanse ulcer with saline or soap & water before dressing change.  May use Vaseline (petrolatum), Aquaphor, Aveeno, CeraVe, Cetaphil, Eucerin, Lubriderm, etc for dry skin. Dressing type for home: Other: Epifix strands left in place and secured with Mepitel AG and steri strips then Mepilex Border for the week. Single dermafit. Written discharge instructions given to patient. Follow up in 1 week.     Electronically signed by JENA Rivero on 11/24/2017 at 12:53 PM.

## 2017-11-28 ENCOUNTER — HOSPITAL ENCOUNTER (OUTPATIENT)
Dept: WOUND CARE | Age: 72
Discharge: HOME | End: 2017-11-28
Attending: CLINICAL NURSE SPECIALIST | Admitting: CLINICAL NURSE SPECIALIST

## 2017-11-28 VITALS
HEART RATE: 87 BPM | TEMPERATURE: 97.1 F | SYSTOLIC BLOOD PRESSURE: 136 MMHG | HEIGHT: 59 IN | BODY MASS INDEX: 33.67 KG/M2 | DIASTOLIC BLOOD PRESSURE: 90 MMHG | RESPIRATION RATE: 18 BRPM | WEIGHT: 167 LBS

## 2017-11-28 PROCEDURE — 15271 SKIN SUB GRAFT TRNK/ARM/LEG: CPT | Performed by: CLINICAL NURSE SPECIALIST

## 2017-11-29 NOTE — PLAN OF CARE
Problem: Impaired Skin Integrity  Goal: Wound size and drainage will decrease and surrounding tissue/skin remains healthy and intact  Outcome: Ongoing  Pt seen in [facility] - wound cont to improve -77% healed - Debride per Theoplis Sieving, CNS and #4 epifix applied , covered with mepitel ag and border- cont dermafit for compression- reviewed wound care instructions - f/u in 1 week

## 2017-12-02 NOTE — PROGRESS NOTES
88 [facility] Progress Note    Mandy Morrow     : 1945    DATE OF VISIT:  2017    Subjective:     Mandy Morrow is a 74 y.o. female who has a venous ulcer located on the left lower leg. Significant symptoms or pertinent wound history since last visit: Offers no complaints regarding wound or treatment. She is elevating her legs and ambulating. Appetite is good. No fever or chills. Continues with Methotrexate for Eczema, extending wound healing time. Additional ulcer(s) noted? no.     Her current medication list consists of Lactobacillus, albuterol sulfate HFA, baclofen, chlorthalidone, doxepin, fluticasone, folic acid, furosemide, methotrexate, morphine, oxyCODONE HCl, polyethylene glycol, potassium chloride, ranitidine, spironolactone, topiramate, triamcinolone, and vitamin D. Allergies: Latex; Nystatin; Moustapha [ensure]; Other; Remeron [mirtazapine]; Sulfa antibiotics; Theophyllines;  Wound dressings [woun'dres hydrogel wound dress]; and Tape [adhesive tape]    Objective:     Vitals:    17 1545   BP: (!) 136/90   Pulse: 87   Resp: 18   Temp: 97.1 °F (36.2 °C)   TempSrc: Oral   Weight: 167 lb (75.8 kg)   Height: 4' 11\" (1.499 m)     General Appearance: alert and oriented to person, place and time, over weight, in no acute distress  Psychiatric:  Mood and affect appropriate for situation  Skin: warm and dry, no rash  Head: normocephalic and atraumatic  Eyes: pupils equal, round, sclerae anicteric, conjunctivae normal  ENT: no thrush or oral ulcers  Neck:No complaints, normal appearance  Pulmonary/Chest: Respirations easy at rest, no cough or respiratory distress  Cardiovascular: No chest pain, normal rate, toes warm, cap refill normal, DANTE: 1.03, PT and DP pulse +2  Abdomen: No nausea or vomiting  Extremities: no cyanosis or cellulitis, bilateral leg edema  Musculoskeletal: Ambulatory, moves all extremities, no deformities  Neurologic: distal sensation to light touch intact, no allodynia. Erendira-ulcer skin: Intact. Ulcer(s): Wound with increased red granulation and decreased fibrin. New epithelium present. Wound bed moist, edges open and attached. Surrounding tissue and ulcer without signs and symptoms of infection. No purulence, malodor, erythema, increased temperature, or increased pain. Pre-debridement wound measurements are in the wound documentation flowsheet. Photos also saved in electronic chart. Assessment:     Patient Active Problem List   Diagnosis Code    Ulcer of calf ([facility] [address] L97.209    Venous (peripheral) insufficiency I87.2    DDD (degenerative disc disease), lumbar M51.36    HTN (hypertension) I10    Asthma J45.909    Raynaud disease I73.00    Edema R60.9    Disc displacement, lumbar M51.26    Lumbar stenosis M48.061    Opiate analgesic contract exists Z02.89    Dyspnea R06.00    Chest pain R07.9    PVC's (premature ventricular contractions) I49.3    Venous ulcer of left leg (HCC) I83.029    Hypotension I95.9    Acute kidney injury ([facility] [address] N17.9    Hypovolemia E86.1    Arthralgia of right hip M25.551    Intrinsic eczema L20.84    Incisional hernia, without obstruction or gangrene K43.2       Assessment of today's active condition(s): VLU's healing slowly. Factors contributing to occurrence and/or persistence of the chronic ulcer include edema, venous stasis, obesity and immunosuppression. Medical necessity of today's visit is shown by the above documentation. Sharp debridement is indicated today, based upon the exam findings in the wound(s) above. Procedure note:     Consent obtained. Time out performed per [facility] policy. Anesthetic  Anesthetic: 4% Topical Xylocaine     Using a curette, I sharply debrided the left lower leg ulcer(s) down through and including the removal of epidermis and dermis. The type(s) of tissue debrided included fibrin, biofilm and exudate. Total Surface Area Debrided: 1.8 sq cm.     Post  Debridement Measurements:  Wound 07/25/17 #11, left pretib, VLU, full thickness, onset 6/21/17,gradually appeared-Wound Length (cm): 1.2 cm    Wound 07/25/17 #11, left pretib, VLU, full thickness, onset 6/21/17,gradually appeared-Wound Width (cm): 1.5 cm    Wound 07/25/17 #11, left pretib, VLU, full thickness, onset 6/21/17,gradually appeared-Wound Depth (cm) : 0.1    The ulcers were then irrigated with normal saline solution. The procedure was completed with a small amount of bleeding, and hemostasis was by pressure. The patient tolerated the procedure well, with no significant complications. The patient's level of pain during and after the procedure was monitored, and is noted in the wound documentation flowsheet. Because this patient's venous ulcer has failed to respond to standard wound-care measures (including treatment of infection, debridement of necrosis, treatment of edema, provision of a moist wound environment, and compression) for more than 4 weeks, I have again recommended EpiFix (#4) as adjunctive therapy to help speed healing of this ulcer. As per previous documentation, peripheral pulse exam is acceptable, and DANTE is 1.03. There is no evidence of untreated infection today. Ms. Sonido Noe does not currently smoke. After cleansing the venous ulcer with saline and applying Skin-Prep to the darrius-wound skin, a 4 sqcm piece of EpiFix was cut to fit the ulcer, placed into the ulcer bed, moistened with saline, covered with Mepitel Ag and secured with steri strips. 4 sqcm of the product was used in the ulcer, and 0 sqcm of the product was discarded. The patient tolerated the procedure well, without complications. I reminded the patient of the importance of weight management and encouraged ambulation as tolerated, additional lower extremity exercises as instructed in our education sheet, leg elevation when at rest, and compliance with any recommended dietary, diuretic and compression therapies.     Discharge plan:

## 2017-12-05 ENCOUNTER — HOSPITAL ENCOUNTER (OUTPATIENT)
Dept: WOUND CARE | Age: 72
Discharge: HOME | End: 2017-12-05
Attending: CLINICAL NURSE SPECIALIST | Admitting: CLINICAL NURSE SPECIALIST

## 2017-12-05 VITALS
DIASTOLIC BLOOD PRESSURE: 93 MMHG | WEIGHT: 169 LBS | RESPIRATION RATE: 20 BRPM | BODY MASS INDEX: 34.13 KG/M2 | SYSTOLIC BLOOD PRESSURE: 152 MMHG | TEMPERATURE: 96 F | HEART RATE: 81 BPM

## 2017-12-05 PROCEDURE — 99212 OFFICE O/P EST SF 10 MIN: CPT | Performed by: CLINICAL NURSE SPECIALIST

## 2017-12-05 ASSESSMENT — PAIN SCALES - GENERAL: PAINLEVEL_OUTOF10: 0

## 2017-12-09 NOTE — PROGRESS NOTES
[facility] Progress Note    Rory Lopes     : 1945    DATE OF VISIT:  2017    Subjective:     Rory Lopes is a 74 y.o. female who has a venous ulcer located on the left leg. Current complaint of pain in this ulcer? no.    Other significant symptoms or pertinent ulcer history: Offers no complaints regarding wound or treatment. She is elevating her legs and ambulating. More edema this week. She states this happens off and on. Encouraged to follow up with PCP. Appetite is good. No fever or chills. Continues to take Methotrexate for eczema. Additional ulcer(s) noted? no.     Ms. Cecilio Barragan has a past medical history of Arthritis; Asthma; Bladder disorder; Eczema; History of blood transfusion; Hx of blood clots; Hypertension; Nausea & vomiting; Nonhealing nonsurgical wound; PONV (postoperative nausea and vomiting); Raynaud disease; Scabies; and UTI (urinary tract infection). She has a past surgical history that includes Lumbar spine surgery; Bony pelvis surgery; Hysterectomy; Ovary removal (Bilateral); Cholecystectomy; Appendectomy; Anterior cruciate ligament repair; Bunionectomy; hernia repair; Skin graft (Left); back surgery; Umbilical hernia repair (13); amputation (Left, 2014); Lumbar spine surgery; and Incisional hernia repair (N/A, 2017). Her family history includes Cancer in her brother; High Blood Pressure in her mother; Other in her father. Ms. Cecilio Barragan reports that she quit smoking about 37 years ago. She has a 10.00 pack-year smoking history. She has never used smokeless tobacco. She reports that she does not drink alcohol or use drugs.     Her current medication list consists of Lactobacillus, albuterol sulfate HFA, baclofen, chlorthalidone, doxepin, fluticasone, folic acid, furosemide, methotrexate, morphine, oxyCODONE HCl, polyethylene glycol, potassium chloride, ranitidine, spironolactone, topiramate, triamcinolone, and vitamin Patient Active Problem List   Diagnosis Code    Ulcer of calf ([facility] [address] L97.209    Venous (peripheral) insufficiency I87.2    DDD (degenerative disc disease), lumbar M51.36    HTN (hypertension) I10    Asthma J45.909    Raynaud disease I73.00    Edema R60.9    Disc displacement, lumbar M51.26    Lumbar stenosis M48.061    Opiate analgesic contract exists Z02.89    Dyspnea R06.00    Chest pain R07.9    PVC's (premature ventricular contractions) I49.3    Venous ulcer of left leg ([facility]) I83.029    Hypotension I95.9    Acute kidney injury ([facility] [address] N17.9    Hypovolemia E86.1    Arthralgia of right hip M25.551    Intrinsic eczema L20.84    Incisional hernia, without obstruction or gangrene K43.2       Assessment of today's active condition(s): VLU, left lower leg, slow to heal. With increased granulation this week. Factors contributing to occurrence and/or persistence of the chronic ulcer include edema, venous stasis and immunosuppression. Medical necessity of today's visit is shown by the above documentation. Sharp debridement is not indicated today, based upon the exam findings in the wound(s) above. I reminded the patient of the importance of weight management and ambulation as tolerated, additional lower extremity exercises as instructed in our education sheet, leg elevation when at rest, and compliance with any recommended dietary, diuretic and compression therapies. Complex wound care. Patient taking Methotrexate for eczema which extends wound healing time. Discharge plan:     Leg elevations 2-3 times per day for 30-40 minutes  Ambulate as tolerated  Continue with MVI and added protein in diet    Treatment in the wound care center today: Wound 07/25/17 #11, left pretib, VLU, full thickness, onset 6/21/17,gradually appeared-Dressing/Treatment:  (mepitel ag, steristrips, mepilex border). Home treatment: good handwashing before and after any dressing changes.  Cleanse ulcer with saline or soap & water before dressing change. May use Vaseline (petrolatum), Aquaphor, Aveeno, CeraVe, Cetaphil, Eucerin, Lubriderm, etc for dry skin. Dressing type for home: Other: Mepitel Ag, steri strips, mepilex border for the week. Dermafit for compression. Written discharge instructions given to patient. Follow up in 1 week.     Electronically signed by JENA Taylor on 12/9/2017 at 9:44 AM.

## 2017-12-12 ENCOUNTER — HOSPITAL ENCOUNTER (OUTPATIENT)
Dept: WOUND CARE | Age: 72
Discharge: HOME | End: 2017-12-12
Attending: CLINICAL NURSE SPECIALIST | Admitting: CLINICAL NURSE SPECIALIST

## 2017-12-12 VITALS
DIASTOLIC BLOOD PRESSURE: 85 MMHG | TEMPERATURE: 98.4 F | HEART RATE: 77 BPM | WEIGHT: 167.4 LBS | BODY MASS INDEX: 33.75 KG/M2 | SYSTOLIC BLOOD PRESSURE: 126 MMHG | RESPIRATION RATE: 18 BRPM | HEIGHT: 59 IN

## 2017-12-12 PROCEDURE — 15271 SKIN SUB GRAFT TRNK/ARM/LEG: CPT | Performed by: CLINICAL NURSE SPECIALIST

## 2017-12-15 NOTE — PROGRESS NOTES
[facility] Progress Note    Ascencion Rizo     : 1945    DATE OF VISIT:  2017    Subjective:     Ascencion Rizo is a 74 y.o. female who has a venous ulcer located on the left leg. Significant symptoms or pertinent wound history since last visit: Ulcer improving with increased granulation. Offers no complaints regarding wound or treatment. Taking Methotrexate for Eczema. She is elevating her legs and ambulating. Appetite is good. No fever or chills. We will apply #5 Epifix skin substitute today. Additional ulcer(s) noted? no.     Her current medication list consists of Lactobacillus, albuterol sulfate HFA, baclofen, chlorthalidone, doxepin, fluticasone, folic acid, furosemide, methotrexate, morphine, oxyCODONE HCl, polyethylene glycol, potassium chloride, ranitidine, spironolactone, topiramate, triamcinolone, and vitamin D. Allergies: Latex; Nystatin; Moustapha [ensure]; Other; Remeron [mirtazapine]; Sulfa antibiotics; Theophyllines; Wound dressings [woun'dres hydrogel wound dress]; and Tape [adhesive tape]    Objective:     Vitals:    17 1544   BP: 126/85   Pulse: 77   Resp: 18   Temp: 98.4 °F (36.9 °C)   TempSrc: Oral   Weight: 167 lb 6.4 oz (75.9 kg)   Height: 4' 11\" (1.499 m)     General Appearance: alert and oriented to person, place and time, obese, in no acute distress  Psychiatric:  Mood and affect appropriate for situation  Skin: warm and dry, no rash  Head: normocephalic and atraumatic  Eyes: pupils equal, round, sclerae anicteric, conjunctivae normal  ENT: no thrush or oral ulcers  Neck:No complaints, normal appearance  Pulmonary/Chest: Respirations easy at rest, no cough or respiratory distress  Cardiovascular: No chest pain, normal rate, toes warm, cap refill normal, DANTE: 1.03, Pt pulse audible with doppler, DP +2  Abdomen: No nausea or vomiting  Extremities: no cyanosis or cellulitis.   Bilateral leg edema  Musculoskeletal: Ambulatory, moves all extremities, no deformities  Neurologic: distal sensation to light touch intact, no allodynia. Erendira-ulcer skin: Intact. Ulcer(s): Wound with red granulation and biofilm, exudate and minimal fibrin. Wound bed moist, edges open and attached. Surrounding tissue and ulcer without signs and symptoms of infection. No purulence, malodor, erythema, increased temperature, or increased pain. Pre-debridement wound measurements are in the wound documentation flowsheet. Photos also saved in electronic chart. Assessment:     Patient Active Problem List   Diagnosis Code    Ulcer of calf ([facility] [address] L97.209    Venous (peripheral) insufficiency I87.2    DDD (degenerative disc disease), lumbar M51.36    HTN (hypertension) I10    Asthma J45.909    Raynaud disease I73.00    Edema R60.9    Disc displacement, lumbar M51.26    Lumbar stenosis M48.061    Opiate analgesic contract exists Z02.89    Dyspnea R06.00    Chest pain R07.9    PVC's (premature ventricular contractions) I49.3    Venous ulcer of left leg ([facility]) I83.029    Hypotension I95.9    Acute kidney injury ([facility] [address] N17.9    Hypovolemia E86.1    Arthralgia of right hip M25.551    Intrinsic eczema L20.84    Incisional hernia, without obstruction or gangrene K43.2       Assessment of today's active condition(s): VLU healing slowly, showing improvement. Factors contributing to occurrence and/or persistence of the chronic ulcer include edema, venous stasis, obesity and immunosuppression. Medical necessity of today's visit is shown by the above documentation. Sharp debridement is indicated today, based upon the exam findings in the wound(s) above. Procedure note:     Consent obtained. Time out performed per [facility] policy. Anesthetic  Anesthetic: 4% Topical Xylocaine     Using a curette, I sharply debrided the left leg ulcer(s) down through and including the removal of epidermis and dermis. The type(s) of tissue debrided included fibrin, biofilm and exudate.

## 2017-12-19 ENCOUNTER — HOSPITAL ENCOUNTER (OUTPATIENT)
Dept: WOUND CARE | Age: 72
Discharge: HOME | End: 2017-12-19
Attending: CLINICAL NURSE SPECIALIST | Admitting: CLINICAL NURSE SPECIALIST

## 2017-12-19 VITALS
DIASTOLIC BLOOD PRESSURE: 81 MMHG | WEIGHT: 170.13 LBS | RESPIRATION RATE: 20 BRPM | HEART RATE: 85 BPM | TEMPERATURE: 97.5 F | SYSTOLIC BLOOD PRESSURE: 127 MMHG | BODY MASS INDEX: 34.36 KG/M2

## 2017-12-19 PROCEDURE — 99212 OFFICE O/P EST SF 10 MIN: CPT | Performed by: CLINICAL NURSE SPECIALIST

## 2017-12-23 NOTE — PROGRESS NOTES
[facility] Progress Note    Omari Davis     : 1945    DATE OF VISIT:  2017    Subjective:     Omari Davis is a 74 y.o. female who has a venous ulcer located on the left leg. Current complaint of pain in this ulcer? no.    Other significant symptoms or pertinent ulcer history: Offers no complaints regarding wound or treatment. Wound with slight improvement, filling in with increased granulation. #5 Epifix skin substitute application . She is elevating her legs and ambulating. Appetite is good. No fever or chills. Continues Methotrexate for Eczema. Additional ulcer(s) noted? no.     Ms. Roney Costello has a past medical history of Arthritis; Asthma; Bladder disorder; Eczema; History of blood transfusion; Hx of blood clots; Hypertension; Nausea & vomiting; Nonhealing nonsurgical wound; PONV (postoperative nausea and vomiting); Raynaud disease; Scabies; and UTI (urinary tract infection). She has a past surgical history that includes Lumbar spine surgery; Bony pelvis surgery; Hysterectomy; Ovary removal (Bilateral); Cholecystectomy; Appendectomy; Anterior cruciate ligament repair; Bunionectomy; hernia repair; Skin graft (Left); back surgery; Umbilical hernia repair (13); amputation (Left, 2014); Lumbar spine surgery; and Incisional hernia repair (N/A, 2017). Her family history includes Cancer in her brother; High Blood Pressure in her mother; Other in her father. Ms. Roney Costello reports that she quit smoking about 37 years ago. She has a 10.00 pack-year smoking history. She has never used smokeless tobacco. She reports that she does not drink alcohol or use drugs.     Her current medication list consists of Lactobacillus, albuterol sulfate HFA, baclofen, chlorthalidone, doxepin, fluticasone, folic acid, furosemide, methotrexate, morphine, oxyCODONE HCl, polyethylene glycol, potassium chloride, ranitidine, spironolactone, topiramate, triamcinolone, and vitamin D. Allergies: Latex; Nystatin; Moustapha [ensure]; Other; Remeron [mirtazapine]; Sulfa antibiotics; Theophyllines; Wound dressings [woun'dres hydrogel wound dress]; and Tape [adhesive tape]    Pertinent items from the review of systems are discussed in the HPI; the remainder of the ROS was reviewed and is negative. Objective:     Vitals:    12/19/17 1547   BP: 127/81   Pulse: 85   Resp: 20   Temp: 97.5 °F (36.4 °C)   TempSrc: Oral   Weight: 170 lb 2 oz (77.2 kg)     General Appearance: alert and oriented to person, place and time, over weight, in no acute distress  Psychiatric:  Mood and affect appropriate for situation  Skin: warm and dry, no rash  Head: normocephalic and atraumatic  Eyes: pupils equal, round, sclerae anicteric, conjunctivae normal  ENT: no thrush or oral ulcers  Neck:No complaints, normal appearance  Pulmonary/Chest: Respirations easy at rest, no cough or respiratory distress  Cardiovascular: No chest pain, normal rate, toes warm, cap refill normal, DANTE: 1.03, PT pulse +1, DP +2  Abdomen: No nausea or vomiting  Extremities: no cyanosis or cellulitis, minimal edema  Musculoskeletal: Ambulatory, moves all extremities, no deformities  Neurologic: distal sensation to light touch intact, no allodynia. Erendira-ulcer skin:  Intact. Ulcer(s): Wound with increased red granulation and remanents of Epifix. No debridement this week, just gentle cleansing. Wound bed moist, edges open and attached. Surrounding tissue and ulcer without signs and symptoms of infection. No purulence, malodor, erythema, increased temperature, or increased pain. Today's ulcer measurements are in the electronic flowsheet. Photos also saved in electronic chart.     Lab Results   Component Value Date    LABALBU 4.0 10/11/2017     Lab Results   Component Value Date    CREATININE 0.8 10/17/2017     Lab Results   Component Value Date    HGB 13.0 10/11/2017     No results found for: LABA1C  Assessment: Patient Active Problem List   Diagnosis Code    Ulcer of calf ([facility] [address] L97.209    Venous (peripheral) insufficiency I87.2    DDD (degenerative disc disease), lumbar M51.36    HTN (hypertension) I10    Asthma J45.909    Raynaud disease I73.00    Edema R60.9    Disc displacement, lumbar M51.26    Lumbar stenosis M48.061    Opiate analgesic contract exists Z02.89    Dyspnea R06.00    Chest pain R07.9    PVC's (premature ventricular contractions) I49.3    Venous ulcer of left leg ([facility]) I83.029    Hypotension I95.9    Acute kidney injury ([facility] [address] N17.9    Hypovolemia E86.1    Arthralgia of right hip M25.551    Intrinsic eczema L20.84    Incisional hernia, without obstruction or gangrene K43.2       Assessment of today's active condition(s): VLU showing improvement with Epifix skin substitiute. Factors contributing to occurrence and/or persistence of the chronic ulcer include edema, venous stasis and immunosuppression. Medical necessity of today's visit is shown by the above documentation. Sharp debridement is not indicated today, based upon the exam findings in the wound(s) above. I reminded the patient of the importance of weight management and encouraged ambulation as tolerated, additional lower extremity exercises as instructed in our education sheet, leg elevation when at rest, and compliance with any recommended dietary, diuretic and compression therapies. Complex wound mangement, taking Methotrexate, which extends wound healing times. Discharge plan:     Continue leg elevations 2-3 times per day for 30-40 minutes  Continue MVI and added protein in diet    Treatment in the wound care center today: Wound 07/25/17 #11, left pretib, VLU, full thickness, onset 6/21/17,gradually appeared-Dressing/Treatment:  (Med single Dermafit). Home treatment: good handwashing before and after any dressing changes. Cleanse ulcer with saline or soap & water before dressing change.  May use Vaseline (petrolatum), Aquaphor, Aveeno, CeraVe, Cetaphil, Eucerin, Lubriderm, etc for dry skin. Dressing type for home: Other:Mepitel Ag, secured with steri strips then Mepilex Border for the week. Dermafit for compression. Written discharge instructions given to patient. Follow up in 1 week.     Electronically signed by JENA Edmonds on 12/23/2017 at 10:21 AM.

## 2017-12-26 ENCOUNTER — HOSPITAL ENCOUNTER (OUTPATIENT)
Dept: WOUND CARE | Age: 72
Discharge: HOME | End: 2017-12-26
Attending: CLINICAL NURSE SPECIALIST | Admitting: CLINICAL NURSE SPECIALIST

## 2017-12-26 VITALS
TEMPERATURE: 97.8 F | RESPIRATION RATE: 20 BRPM | WEIGHT: 173.8 LBS | SYSTOLIC BLOOD PRESSURE: 152 MMHG | HEART RATE: 102 BPM | BODY MASS INDEX: 35.04 KG/M2 | HEIGHT: 59 IN | DIASTOLIC BLOOD PRESSURE: 93 MMHG

## 2017-12-26 PROCEDURE — 99212 OFFICE O/P EST SF 10 MIN: CPT | Performed by: CLINICAL NURSE SPECIALIST

## 2017-12-26 ASSESSMENT — PAIN SCALES - GENERAL: PAINLEVEL_OUTOF10: 0

## 2017-12-27 NOTE — PLAN OF CARE
Problem: Impaired Skin Integrity  Goal: Wound size and drainage will decrease and surrounding tissue/skin remains healthy and intact  Outcome: Ongoing  Pt seen in [address] [place] [address],[address] - wound 98% healed - applied collagen today and mepilex border- cont compression w/ dermafit for the next week.  Instructions reviewed - f/u in 1 week

## 2017-12-29 NOTE — PROGRESS NOTES
[facility] Progress Note    Valentina Larios     : 1945    DATE OF VISIT:  2017    Subjective:     Valentina Larios is a 74 y.o. female who has a venous ulcer located on the left leg. Current complaint of pain in this ulcer? no.    Other significant symptoms or pertinent ulcer history: Offers no complaints regarding wound or treatment. Ulcer nearly resloved. She is elevating her legs and ambulating. Appetite is good. Taking MVI daily and added protein in diet. No fever or chills. Taking Methotrexate for eczema. Additional ulcer(s) noted? no.     Ms. Annemarie Rinne has a past medical history of Arthritis; Asthma; Bladder disorder; Eczema; History of blood transfusion; Hx of blood clots; Hypertension; Nausea & vomiting; Nonhealing nonsurgical wound; PONV (postoperative nausea and vomiting); Raynaud disease; Scabies; and UTI (urinary tract infection). She has a past surgical history that includes Lumbar spine surgery; Bony pelvis surgery; Hysterectomy; Ovary removal (Bilateral); Cholecystectomy; Appendectomy; Anterior cruciate ligament repair; Bunionectomy; hernia repair; Skin graft (Left); back surgery; Umbilical hernia repair (13); amputation (Left, 2014); Lumbar spine surgery; and Incisional hernia repair (N/A, 2017). Her family history includes Cancer in her brother; High Blood Pressure in her mother; Other in her father. Ms. Annemarie Rinne reports that she quit smoking about 37 years ago. She has a 10.00 pack-year smoking history. She has never used smokeless tobacco. She reports that she does not drink alcohol or use drugs. Her current medication list consists of Lactobacillus, albuterol sulfate HFA, baclofen, chlorthalidone, doxepin, fluticasone, folic acid, furosemide, methotrexate, morphine, oxyCODONE HCl, polyethylene glycol, potassium chloride, ranitidine, spironolactone, topiramate, triamcinolone, and vitamin D. Allergies: Latex;  Nystatin; Moustapha [ensure]; Other; Remeron [mirtazapine]; Sulfa antibiotics; Theophyllines; Wound dressings [woun'dres hydrogel wound dress]; and Tape [adhesive tape]    Pertinent items from the review of systems are discussed in the HPI; the remainder of the ROS was reviewed and is negative. Objective:     Vitals:    12/26/17 1529   BP: (!) 152/93   Pulse: 102   Resp: 20   Temp: 97.8 °F (36.6 °C)   TempSrc: Oral   Weight: 173 lb 12.8 oz (78.8 kg)   Height: 4' 11\" (1.499 m)     General Appearance: alert and oriented to person, place and time, obese, in no acute distress  Psychiatric:  Mood and affect appropriate for situation  Skin: warm and dry, no rash  Head: normocephalic and atraumatic  Eyes: pupils equal, round, sclerae anicteric, conjunctivae normal  ENT: no thrush or oral ulcers  Neck:No complaints, normal appearance  Pulmonary/Chest: Respirations easy at rest, no cough or respiratory distress  Cardiovascular: No chest pain, normal rate, toes warm, cap refill normal, DANTE: 1.03, PT and DP pulse +1  Abdomen: No nausea or vomiting  Extremities: no cyanosis or cellulitis. Bilateral leg edema  Musculoskeletal: Ambulatory, moves all extremities, no deformities  Neurologic: distal sensation to light touch intact, no allodynia. Erendira-ulcer skin:  Intact. Ulcer(s):  Ulcer nearly resolved. Ulcer with red granulation and new epithelium. Wound bed moist, edges open and attached. Surrounding tissue and ulcer without signs and symptoms of infection. No purulence, malodor, erythema, increased temperature, or increased pain. Today's ulcer measurements are in the electronic flowsheet. Photos also saved in electronic chart.     Lab Results   Component Value Date    LABALBU 4.0 10/11/2017     Lab Results   Component Value Date    CREATININE 0.8 10/17/2017     Lab Results   Component Value Date    HGB 13.0 10/11/2017     No results found for: LABA1C  Assessment:     Patient Active Problem List   Diagnosis Code    Ulcer of calf ([facility] [address] L97.209    Venous (peripheral) insufficiency I87.2    DDD (degenerative disc disease), lumbar M51.36    HTN (hypertension) I10    Asthma J45.909    Raynaud disease I73.00    Edema R60.9    Disc displacement, lumbar M51.26    Lumbar stenosis M48.061    Opiate analgesic contract exists Z02.89    Dyspnea R06.00    Chest pain R07.9    PVC's (premature ventricular contractions) I49.3    Venous ulcer of left leg ([facility]) I83.029    Hypotension I95.9    Acute kidney injury ([facility]) N17.9    Hypovolemia E86.1    Arthralgia of right hip M25.551    Intrinsic eczema L20.84    Incisional hernia, without obstruction or gangrene K43.2       Assessment of today's active condition(s): Left VLU nearly resolved. Factors contributing to occurrence and/or persistence of the chronic ulcer include edema, venous stasis, decreased mobility, obesity, immunosuppression and Raynaud's disease. Medical necessity of today's visit is shown by the above documentation. Sharp debridement is not indicated today, based upon the exam findings in the wound(s) above. I reminded the patient of the importance of weight management and ambulation as tolerated, additional lower extremity exercises as instructed in our education sheet, leg elevation when at rest, and compliance with any recommended dietary, diuretic and compression therapies. Complex wound management, patient taking Methotrexate that extends wound healing time. Discharge plan:     Leg elevations 2-3 times per day for 30-40 minutes  Continue MVI and added protein in diet    Treatment in the wound care center today: Wound 07/25/17 #11, left pretib, VLU, full thickness, onset 6/21/17,gradually appeared-Dressing/Treatment:  (biopad mepilex border). Home treatment: good handwashing before and after any dressing changes. Cleanse ulcer with saline or soap & water before dressing change.  May use Vaseline (petrolatum), Aquaphor, Aveeno, CeraVe, Cetaphil, Eucerin, Lubriderm,

## 2018-01-02 ENCOUNTER — HOSPITAL ENCOUNTER (OUTPATIENT)
Dept: WOUND CARE | Age: 73
Discharge: HOME | End: 2018-01-02
Attending: CLINICAL NURSE SPECIALIST | Admitting: CLINICAL NURSE SPECIALIST

## 2018-01-02 VITALS
DIASTOLIC BLOOD PRESSURE: 79 MMHG | RESPIRATION RATE: 20 BRPM | HEART RATE: 89 BPM | SYSTOLIC BLOOD PRESSURE: 138 MMHG | BODY MASS INDEX: 35.08 KG/M2 | HEIGHT: 59 IN | WEIGHT: 174 LBS | TEMPERATURE: 98.2 F

## 2018-01-02 PROCEDURE — 99212 OFFICE O/P EST SF 10 MIN: CPT | Performed by: CLINICAL NURSE SPECIALIST

## 2018-01-02 ASSESSMENT — PAIN SCALES - GENERAL: PAINLEVEL_OUTOF10: 0

## 2018-01-02 NOTE — PLAN OF CARE
Problem: Impaired Skin Integrity  Goal: Wound size and drainage will decrease and surrounding tissue/skin remains healthy and intact  Outcome: Ongoing  Pt seen in [facility] - wound cont to improve - no debride - ordered biopad tami Deal - instructed pt on how to apply when gets product - applied aqaucel ag in [facility] secured with mepilex border- cont compression with dermafit - edema decreased this week- reviewed [facility] instructions - f/u in 1 week

## 2018-01-05 NOTE — PROGRESS NOTES
[facility] Progress Note    Carmita Tadeo     : 1945    DATE OF VISIT:  2018    Subjective:     Carmita Tadeo is a 74 y.o. female who has a venous ulcer located on the left leg. Current complaint of pain in this ulcer? no.    Other significant symptoms or pertinent ulcer history: 5 applications Epifix skin substitute. Last one 17. Ulcer improving. We will order and change to Biopad Collagen. She is elevating her legs and ambulating. Appetite is good. Taking MVI and added protein in diet. No fever or chills. Continues to take Methotrexate for Eczema. Additional ulcer(s) noted? no.     Ms. Stanford Ortega has a past medical history of Arthritis; Asthma; Bladder disorder; Eczema; History of blood transfusion; Hx of blood clots; Hypertension; Nausea & vomiting; Nonhealing nonsurgical wound; PONV (postoperative nausea and vomiting); Raynaud disease; Scabies; and UTI (urinary tract infection). She has a past surgical history that includes Lumbar spine surgery; Bony pelvis surgery; Hysterectomy; Ovary removal (Bilateral); Cholecystectomy; Appendectomy; Anterior cruciate ligament repair; Bunionectomy; hernia repair; Skin graft (Left); back surgery; Umbilical hernia repair (13); amputation (Left, 2014); Lumbar spine surgery; and Incisional hernia repair (N/A, 2017). Her family history includes Cancer in her brother; High Blood Pressure in her mother; Other in her father. Ms. Stanford Ortega reports that she quit smoking about 37 years ago. She has a 10.00 pack-year smoking history. She has never used smokeless tobacco. She reports that she does not drink alcohol or use drugs.     Her current medication list consists of Lactobacillus, albuterol sulfate HFA, baclofen, chlorthalidone, doxepin, fluticasone, folic acid, furosemide, methotrexate, morphine, oxyCODONE HCl, polyethylene glycol, potassium chloride, ranitidine, spironolactone, topiramate, triamcinolone, and (peripheral) insufficiency I87.2    DDD (degenerative disc disease), lumbar M51.36    HTN (hypertension) I10    Asthma J45.909    Raynaud disease I73.00    Edema R60.9    Disc displacement, lumbar M51.26    Lumbar stenosis M48.061    Opiate analgesic contract exists Z02.89    Dyspnea R06.00    Chest pain R07.9    PVC's (premature ventricular contractions) I49.3    Venous ulcer of left leg (HCC) I83.029    Hypotension I95.9    Acute kidney injury (HCC) N17.9    Hypovolemia E86.1    Arthralgia of right hip M25.551    Intrinsic eczema L20.84    Incisional hernia, without obstruction or gangrene K43.2       Assessment of today's active condition(s): Left VLU, healing slowly. Factors contributing to occurrence and/or persistence of the chronic ulcer include edema, venous stasis, obesity and immunosuppression. Medical necessity of today's visit is shown by the above documentation. Sharp debridement is not indicated today, based upon the exam findings in the wound(s) above. I reminded the patient of the importance of weight management and ambulation as tolerated, additional lower extremity exercises as instructed in our education sheet, leg elevation when at rest, and compliance with any recommended dietary, diuretic and compression therapies. Complex wound management due to taking Methotrexate which extends healing time    Discharge plan:     Leg elevations 2-3 times per day for 30-40 minutes    Treatment in the wound care center today: Wound 07/25/17 #11, left pretib, VLU, full thickness, onset 6/21/17,gradually appeared-Dressing/Treatment: Other (Comment) (aquacel ag,mepilex border,single \"e\" dermafit). Home treatment: good handwashing before and after any dressing changes. Cleanse ulcer with saline or soap & water before dressing change. May use Vaseline (petrolatum), Aquaphor, Aveeno, CeraVe, Cetaphil, Eucerin, Lubriderm, etc for dry skin.      Dressing type for home: Other: Leave dressing

## 2018-01-09 ENCOUNTER — HOSPITAL ENCOUNTER (OUTPATIENT)
Dept: WOUND CARE | Age: 73
Discharge: HOME | End: 2018-01-09
Attending: CLINICAL NURSE SPECIALIST | Admitting: CLINICAL NURSE SPECIALIST

## 2018-01-09 VITALS
SYSTOLIC BLOOD PRESSURE: 142 MMHG | HEART RATE: 75 BPM | DIASTOLIC BLOOD PRESSURE: 87 MMHG | TEMPERATURE: 97.2 F | RESPIRATION RATE: 20 BRPM

## 2018-01-09 PROCEDURE — 99211 OFF/OP EST MAY X REQ PHY/QHP: CPT | Performed by: CLINICAL NURSE SPECIALIST

## 2018-01-09 NOTE — PLAN OF CARE
Problem: Impaired Skin Integrity  Goal: Wound size and drainage will decrease and surrounding tissue/skin remains healthy and intact  Outcome: Completed Date Met: 01/09/18  Pt seen in [facility] - wound healed - reviewed preventative care - no further f/u required - treatment completed- Encouraged pt to order compression stockings as tolerated for swelling

## 2018-01-12 NOTE — PROGRESS NOTES
analgesic contract exists Z02.89    Dyspnea R06.00    Chest pain R07.9    PVC's (premature ventricular contractions) I49.3    Venous ulcer of left leg (HCC) I83.029    Hypotension I95.9    Acute kidney injury (HCC) N17.9    Hypovolemia E86.1    Arthralgia of right hip M25.551    Intrinsic eczema L20.84    Incisional hernia, without obstruction or gangrene K43.2       Assessment of today's active condition(s): Resolved VLU. Factors contributing to occurrence and/or persistence of the chronic ulcer include edema, venous stasis and immunosuppression. Medical necessity of today's visit is shown by the above documentation. Sharp debridement is not indicated today, based upon the exam findings in the wound(s) above. I reminded the patient of the importance of weight management and ambulation as tolerated, additional lower extremity exercises as instructed in our education sheet, leg elevation when at rest, and compliance with any recommended dietary, diuretic and compression therapies. Discharge plan:     Continue to elevate legs 2-3 times per day for 30-40 minutes  Compression stockings on AM off PM    Treatment in the wound care center today: Wound 07/25/17 #11, left pretib, VLU, full thickness, onset 6/21/17,gradually appeared-Dressing/Treatment:  (dermafit size e). Home treatment: good handwashing before and after any dressing changes. Cleanse ulcer with saline or soap & water before dressing change. May use Vaseline (petrolatum), Aquaphor, Aveeno, CeraVe, Cetaphil, Eucerin, Lubriderm, etc for dry skin. Dressing type for home: Other: Moisturize daily and wear compression stockings. Written discharge instructions given to patient. Follow up in wound clinic for wound care concerns.     Electronically signed by JENA Hilton on 1/12/2018 at 11:06 AM.

## 2018-01-15 ENCOUNTER — OFFICE VISIT (OUTPATIENT)
Dept: FAMILY MEDICINE CLINIC | Age: 73
End: 2018-01-15

## 2018-01-15 VITALS
BODY MASS INDEX: 34.47 KG/M2 | SYSTOLIC BLOOD PRESSURE: 130 MMHG | WEIGHT: 171 LBS | HEIGHT: 59 IN | DIASTOLIC BLOOD PRESSURE: 88 MMHG | OXYGEN SATURATION: 97 % | HEART RATE: 81 BPM

## 2018-01-15 DIAGNOSIS — I10 ESSENTIAL HYPERTENSION: Primary | ICD-10-CM

## 2018-01-15 DIAGNOSIS — M51.36 DDD (DEGENERATIVE DISC DISEASE), LUMBAR: ICD-10-CM

## 2018-01-15 PROCEDURE — 1123F ACP DISCUSS/DSCN MKR DOCD: CPT | Performed by: FAMILY MEDICINE

## 2018-01-15 PROCEDURE — 99213 OFFICE O/P EST LOW 20 MIN: CPT | Performed by: FAMILY MEDICINE

## 2018-01-15 PROCEDURE — 1036F TOBACCO NON-USER: CPT | Performed by: FAMILY MEDICINE

## 2018-01-15 PROCEDURE — 1090F PRES/ABSN URINE INCON ASSESS: CPT | Performed by: FAMILY MEDICINE

## 2018-01-15 PROCEDURE — G8427 DOCREV CUR MEDS BY ELIG CLIN: HCPCS | Performed by: FAMILY MEDICINE

## 2018-01-15 PROCEDURE — G8417 CALC BMI ABV UP PARAM F/U: HCPCS | Performed by: FAMILY MEDICINE

## 2018-01-15 PROCEDURE — 3017F COLORECTAL CA SCREEN DOC REV: CPT | Performed by: FAMILY MEDICINE

## 2018-01-15 PROCEDURE — 4040F PNEUMOC VAC/ADMIN/RCVD: CPT | Performed by: FAMILY MEDICINE

## 2018-01-15 PROCEDURE — G8484 FLU IMMUNIZE NO ADMIN: HCPCS | Performed by: FAMILY MEDICINE

## 2018-01-15 PROCEDURE — 3014F SCREEN MAMMO DOC REV: CPT | Performed by: FAMILY MEDICINE

## 2018-01-15 PROCEDURE — G8399 PT W/DXA RESULTS DOCUMENT: HCPCS | Performed by: FAMILY MEDICINE

## 2018-01-15 RX ORDER — FUROSEMIDE 40 MG/1
TABLET ORAL
Qty: 180 TABLET | Refills: 3 | Status: SHIPPED | OUTPATIENT
Start: 2018-01-15

## 2018-01-15 RX ORDER — FLUTICASONE PROPIONATE 50 MCG
SPRAY, SUSPENSION (ML) NASAL
Qty: 1 BOTTLE | Refills: 11 | Status: SHIPPED | OUTPATIENT
Start: 2018-01-15 | End: 2018-01-16 | Stop reason: SDUPTHER

## 2018-01-15 ASSESSMENT — ENCOUNTER SYMPTOMS
COUGH: 0
CONSTIPATION: 1
BACK PAIN: 1
SHORTNESS OF BREATH: 0

## 2018-01-16 RX ORDER — FLUTICASONE PROPIONATE 50 MCG
SPRAY, SUSPENSION (ML) NASAL
Qty: 3 BOTTLE | Refills: 3 | Status: SHIPPED | OUTPATIENT
Start: 2018-01-16

## 2018-06-12 ENCOUNTER — HOSPITAL ENCOUNTER (OUTPATIENT)
Dept: GENERAL RADIOLOGY | Age: 73
Discharge: HOME | End: 2018-06-13

## 2018-06-12 ENCOUNTER — HOSPITAL ENCOUNTER (OUTPATIENT)
Dept: OTHER | Age: 73
Discharge: HOME | End: 2018-06-13
Attending: PHYSICAL MEDICINE & REHABILITATION | Admitting: PHYSICAL MEDICINE & REHABILITATION

## 2018-06-12 ENCOUNTER — HOSPITAL ENCOUNTER (OUTPATIENT)
Dept: GENERAL RADIOLOGY | Age: 73
Discharge: HOME | End: 2018-06-12

## 2018-06-12 DIAGNOSIS — M54.40 LOW BACK PAIN WITH SCIATICA, SCIATICA LATERALITY UNSPECIFIED, UNSPECIFIED BACK PAIN LATERALITY, UNSPECIFIED CHRONICITY: ICD-10-CM

## 2018-06-12 DIAGNOSIS — M54.6 MIDLINE THORACIC BACK PAIN, UNSPECIFIED CHRONICITY: ICD-10-CM

## 2018-07-19 RX ORDER — POLYETHYLENE GLYCOL 3350 17 G/17G
POWDER, FOR SOLUTION ORAL
Qty: 1020 G | Refills: 5 | Status: SHIPPED | OUTPATIENT
Start: 2018-07-19

## 2018-07-23 ENCOUNTER — TELEPHONE (OUTPATIENT)
Dept: FAMILY MEDICINE CLINIC | Age: 73
End: 2018-07-23

## 2018-07-23 RX ORDER — RANITIDINE 150 MG/1
TABLET ORAL
Qty: 180 TABLET | Refills: 2 | Status: SHIPPED | OUTPATIENT
Start: 2018-07-23

## 2018-07-23 NOTE — TELEPHONE ENCOUNTER
Look to the patient. Needs a refill on Zantac and sent to the pharmacy. Daughter had sudden death 3 days ago. Her brother and sister-in-law have come from out of town to help her. Patient feels she will have to sell her home and move, probably to a  [place] where she has family. Offered my condolences and support as needed.

## 2018-07-23 NOTE — TELEPHONE ENCOUNTER
Pt called stating she is wanting to make an appointment, but her daughter passed away suddenly a few days ago and its going to be a struggle to get into see you. Before she comes in, she would like for you to call and talk to her. Please advise.

## 2018-07-24 ENCOUNTER — TELEPHONE (OUTPATIENT)
Dept: FAMILY MEDICINE CLINIC | Age: 73
End: 2018-07-24

## 2018-07-26 ENCOUNTER — OFFICE VISIT (OUTPATIENT)
Dept: FAMILY MEDICINE CLINIC | Age: 73
End: 2018-07-26

## 2018-07-26 VITALS
HEART RATE: 93 BPM | OXYGEN SATURATION: 94 % | WEIGHT: 168 LBS | DIASTOLIC BLOOD PRESSURE: 86 MMHG | HEIGHT: 59 IN | SYSTOLIC BLOOD PRESSURE: 136 MMHG | BODY MASS INDEX: 33.87 KG/M2

## 2018-07-26 DIAGNOSIS — I10 ESSENTIAL HYPERTENSION: ICD-10-CM

## 2018-07-26 DIAGNOSIS — M51.36 DDD (DEGENERATIVE DISC DISEASE), LUMBAR: ICD-10-CM

## 2018-07-26 DIAGNOSIS — F43.21 GRIEF REACTION: Primary | ICD-10-CM

## 2018-07-26 PROCEDURE — 99213 OFFICE O/P EST LOW 20 MIN: CPT | Performed by: FAMILY MEDICINE

## 2018-07-26 PROCEDURE — 3017F COLORECTAL CA SCREEN DOC REV: CPT | Performed by: FAMILY MEDICINE

## 2018-07-26 PROCEDURE — G8427 DOCREV CUR MEDS BY ELIG CLIN: HCPCS | Performed by: FAMILY MEDICINE

## 2018-07-26 PROCEDURE — 4040F PNEUMOC VAC/ADMIN/RCVD: CPT | Performed by: FAMILY MEDICINE

## 2018-07-26 PROCEDURE — 1123F ACP DISCUSS/DSCN MKR DOCD: CPT | Performed by: FAMILY MEDICINE

## 2018-07-26 PROCEDURE — G8417 CALC BMI ABV UP PARAM F/U: HCPCS | Performed by: FAMILY MEDICINE

## 2018-07-26 PROCEDURE — 1101F PT FALLS ASSESS-DOCD LE1/YR: CPT | Performed by: FAMILY MEDICINE

## 2018-07-26 PROCEDURE — G8399 PT W/DXA RESULTS DOCUMENT: HCPCS | Performed by: FAMILY MEDICINE

## 2018-07-26 PROCEDURE — 1090F PRES/ABSN URINE INCON ASSESS: CPT | Performed by: FAMILY MEDICINE

## 2018-07-26 PROCEDURE — 1036F TOBACCO NON-USER: CPT | Performed by: FAMILY MEDICINE

## 2018-07-26 ASSESSMENT — PATIENT HEALTH QUESTIONNAIRE - PHQ9
SUM OF ALL RESPONSES TO PHQ QUESTIONS 1-9: 2
SUM OF ALL RESPONSES TO PHQ9 QUESTIONS 1 & 2: 2
1. LITTLE INTEREST OR PLEASURE IN DOING THINGS: 1
2. FEELING DOWN, DEPRESSED OR HOPELESS: 1

## 2018-07-26 ASSESSMENT — ENCOUNTER SYMPTOMS: SHORTNESS OF BREATH: 0

## 2018-08-05 ASSESSMENT — ENCOUNTER SYMPTOMS
COUGH: 0
BACK PAIN: 1
WHEEZING: 0

## 2018-08-08 ENCOUNTER — TELEPHONE (OUTPATIENT)
Dept: FAMILY MEDICINE CLINIC | Age: 73
End: 2018-08-08

## 2018-08-08 NOTE — TELEPHONE ENCOUNTER
Talked with patient, she is leaving on Thursday for [place]. She will need her medical records. I explained to her how to get them, she also wanted to say goodbye.

## 2022-08-24 RX ORDER — RANITIDINE 150 MG/1
TABLET ORAL
Qty: 180 TABLET | Refills: 0 | OUTPATIENT
Start: 2022-08-24